# Patient Record
Sex: MALE | Race: WHITE | NOT HISPANIC OR LATINO | Employment: FULL TIME | URBAN - METROPOLITAN AREA
[De-identification: names, ages, dates, MRNs, and addresses within clinical notes are randomized per-mention and may not be internally consistent; named-entity substitution may affect disease eponyms.]

---

## 2017-03-07 RX ORDER — LOSARTAN POTASSIUM 100 MG/1
TABLET ORAL
Qty: 90 TABLET | Refills: 0 | Status: SHIPPED | OUTPATIENT
Start: 2017-03-07 | End: 2017-04-28 | Stop reason: DRUGHIGH

## 2017-04-28 ENCOUNTER — OFFICE VISIT (OUTPATIENT)
Dept: FAMILY MEDICINE CLINIC | Facility: CLINIC | Age: 47
End: 2017-04-28

## 2017-04-28 VITALS
DIASTOLIC BLOOD PRESSURE: 72 MMHG | WEIGHT: 188.8 LBS | BODY MASS INDEX: 27.03 KG/M2 | HEART RATE: 64 BPM | HEIGHT: 70 IN | SYSTOLIC BLOOD PRESSURE: 118 MMHG | OXYGEN SATURATION: 97 % | RESPIRATION RATE: 16 BRPM

## 2017-04-28 DIAGNOSIS — I10 ESSENTIAL HYPERTENSION: Primary | ICD-10-CM

## 2017-04-28 PROCEDURE — 99213 OFFICE O/P EST LOW 20 MIN: CPT | Performed by: FAMILY MEDICINE

## 2017-04-29 NOTE — PROGRESS NOTES
"Subjective   Iam Blanco is a 46 y.o. male.     Hypertension   This is a chronic problem. The problem is unchanged. The problem is controlled. Pertinent negatives include no chest pain, headaches, malaise/fatigue, palpitations, peripheral edema or shortness of breath. Risk factors for coronary artery disease include male gender, sedentary lifestyle and smoking/tobacco exposure. Past treatments include angiotensin blockers. The current treatment provides significant improvement. There are no compliance problems.  There is no history of angina, kidney disease or CAD/MI.        The following portions of the patient's history were reviewed and updated as appropriate: allergies, current medications, past social history and problem list.    Review of Systems   Constitutional: Negative for fatigue, malaise/fatigue and unexpected weight change.   HENT: Negative for congestion and sore throat.    Respiratory: Negative for cough, chest tightness and shortness of breath.    Cardiovascular: Negative for chest pain, palpitations and leg swelling.   Gastrointestinal: Negative for nausea.   Skin: Negative for color change and rash.   Neurological: Negative for dizziness, syncope, weakness and headaches.       Objective   /72  Pulse 64  Resp 16  Ht 70\" (177.8 cm)  Wt 188 lb 12.8 oz (85.6 kg)  SpO2 97%  BMI 27.09 kg/m2  Physical Exam   Constitutional: He is oriented to person, place, and time. He appears well-developed and well-nourished. He is cooperative.   HENT:   Head: Normocephalic.   Right Ear: External ear normal.   Left Ear: External ear normal.   Nose: Nose normal.   Mouth/Throat: Oropharynx is clear and moist.   Eyes: Conjunctivae are normal. Pupils are equal, round, and reactive to light. No scleral icterus.   Neck: Neck supple. Carotid bruit is not present. No thyromegaly present.   Cardiovascular: Normal rate, regular rhythm and normal heart sounds.    Pulmonary/Chest: Effort normal and breath sounds " normal.   Abdominal: There is no hepatosplenomegaly.   Musculoskeletal: Normal range of motion. He exhibits no edema.   Neurological: He is alert and oriented to person, place, and time.   No focal deficits no lateralizing signs   Skin: Skin is warm and dry. No rash noted.   Psychiatric: He has a normal mood and affect. Cognition and memory are normal.   Nursing note and vitals reviewed.      Assessment/Plan   Problem List Items Addressed This Visit        Cardiovascular and Mediastinum    Hypertension - Primary          No orders of the defined types were placed in this encounter.    Patient is much improved since curtailing alcohol.  He also gave up smoking.  May consider reducing medication again if symptoms continue to improve and blood pressure remains normal.

## 2017-08-31 ENCOUNTER — OFFICE VISIT (OUTPATIENT)
Dept: FAMILY MEDICINE CLINIC | Facility: CLINIC | Age: 47
End: 2017-08-31

## 2017-08-31 VITALS
TEMPERATURE: 98.1 F | DIASTOLIC BLOOD PRESSURE: 72 MMHG | BODY MASS INDEX: 26.28 KG/M2 | RESPIRATION RATE: 16 BRPM | SYSTOLIC BLOOD PRESSURE: 112 MMHG | HEIGHT: 70 IN | OXYGEN SATURATION: 98 % | WEIGHT: 183.6 LBS | HEART RATE: 73 BPM

## 2017-08-31 DIAGNOSIS — M25.561 RIGHT KNEE PAIN, UNSPECIFIED CHRONICITY: ICD-10-CM

## 2017-08-31 DIAGNOSIS — I10 ESSENTIAL HYPERTENSION: ICD-10-CM

## 2017-08-31 DIAGNOSIS — Z00.00 WELL ADULT EXAM: Primary | ICD-10-CM

## 2017-08-31 LAB
ALBUMIN SERPL-MCNC: 4.8 G/DL (ref 3.2–4.8)
ALBUMIN/GLOB SERPL: 1.8 G/DL (ref 1.5–2.5)
ALP SERPL-CCNC: 57 U/L (ref 25–100)
ALT SERPL W P-5'-P-CCNC: 36 U/L (ref 7–40)
ANION GAP SERPL CALCULATED.3IONS-SCNC: 8 MMOL/L (ref 3–11)
ARTICHOKE IGE QN: 118 MG/DL (ref 0–130)
AST SERPL-CCNC: 24 U/L (ref 0–33)
BASOPHILS # BLD AUTO: 0.04 10*3/MM3 (ref 0–0.2)
BASOPHILS NFR BLD AUTO: 0.4 % (ref 0–1)
BILIRUB BLD-MCNC: NEGATIVE MG/DL
BILIRUB SERPL-MCNC: 1 MG/DL (ref 0.3–1.2)
BUN BLD-MCNC: 15 MG/DL (ref 9–23)
BUN/CREAT SERPL: 16.7 (ref 7–25)
CALCIUM SPEC-SCNC: 10.2 MG/DL (ref 8.7–10.4)
CHLORIDE SERPL-SCNC: 103 MMOL/L (ref 99–109)
CHOLEST SERPL-MCNC: 183 MG/DL (ref 0–200)
CLARITY, POC: CLEAR
CO2 SERPL-SCNC: 31 MMOL/L (ref 20–31)
COLOR UR: YELLOW
CREAT BLD-MCNC: 0.9 MG/DL (ref 0.6–1.3)
DEPRECATED RDW RBC AUTO: 41.1 FL (ref 37–54)
EOSINOPHIL # BLD AUTO: 0.26 10*3/MM3 (ref 0–0.3)
EOSINOPHIL NFR BLD AUTO: 2.7 % (ref 0–3)
ERYTHROCYTE [DISTWIDTH] IN BLOOD BY AUTOMATED COUNT: 12.9 % (ref 11.3–14.5)
GFR SERPL CREATININE-BSD FRML MDRD: 91 ML/MIN/1.73
GLOBULIN UR ELPH-MCNC: 2.7 GM/DL
GLUCOSE BLD-MCNC: 91 MG/DL (ref 70–100)
GLUCOSE UR STRIP-MCNC: NEGATIVE MG/DL
HCT VFR BLD AUTO: 47.8 % (ref 38.9–50.9)
HDLC SERPL-MCNC: 51 MG/DL (ref 40–60)
HGB BLD-MCNC: 16.2 G/DL (ref 13.1–17.5)
IMM GRANULOCYTES # BLD: 0.02 10*3/MM3 (ref 0–0.03)
IMM GRANULOCYTES NFR BLD: 0.2 % (ref 0–0.6)
KETONES UR QL: NEGATIVE
LEUKOCYTE EST, POC: NEGATIVE
LYMPHOCYTES # BLD AUTO: 3.66 10*3/MM3 (ref 0.6–4.8)
LYMPHOCYTES NFR BLD AUTO: 38 % (ref 24–44)
MCH RBC QN AUTO: 29.6 PG (ref 27–31)
MCHC RBC AUTO-ENTMCNC: 33.9 G/DL (ref 32–36)
MCV RBC AUTO: 87.4 FL (ref 80–99)
MONOCYTES # BLD AUTO: 0.83 10*3/MM3 (ref 0–1)
MONOCYTES NFR BLD AUTO: 8.6 % (ref 0–12)
NEUTROPHILS # BLD AUTO: 4.83 10*3/MM3 (ref 1.5–8.3)
NEUTROPHILS NFR BLD AUTO: 50.1 % (ref 41–71)
NITRITE UR-MCNC: NEGATIVE MG/ML
PH UR: 6 [PH] (ref 5–8)
PLATELET # BLD AUTO: 244 10*3/MM3 (ref 150–450)
PMV BLD AUTO: 12.3 FL (ref 6–12)
POTASSIUM BLD-SCNC: 4.4 MMOL/L (ref 3.5–5.5)
PROT SERPL-MCNC: 7.5 G/DL (ref 5.7–8.2)
PROT UR STRIP-MCNC: NEGATIVE MG/DL
PSA SERPL-MCNC: 0.6 NG/ML (ref 0–4)
RBC # BLD AUTO: 5.47 10*6/MM3 (ref 4.2–5.76)
RBC # UR STRIP: NEGATIVE /UL
SODIUM BLD-SCNC: 142 MMOL/L (ref 132–146)
SP GR UR: 1.01 (ref 1–1.03)
TRIGL SERPL-MCNC: 110 MG/DL (ref 0–150)
TSH SERPL DL<=0.05 MIU/L-ACNC: 1.33 MIU/ML (ref 0.35–5.35)
UROBILINOGEN UR QL: NORMAL
WBC NRBC COR # BLD: 9.64 10*3/MM3 (ref 3.5–10.8)

## 2017-08-31 PROCEDURE — 80053 COMPREHEN METABOLIC PANEL: CPT | Performed by: FAMILY MEDICINE

## 2017-08-31 PROCEDURE — 84443 ASSAY THYROID STIM HORMONE: CPT | Performed by: FAMILY MEDICINE

## 2017-08-31 PROCEDURE — 93000 ELECTROCARDIOGRAM COMPLETE: CPT | Performed by: FAMILY MEDICINE

## 2017-08-31 PROCEDURE — 84153 ASSAY OF PSA TOTAL: CPT | Performed by: FAMILY MEDICINE

## 2017-08-31 PROCEDURE — 99396 PREV VISIT EST AGE 40-64: CPT | Performed by: FAMILY MEDICINE

## 2017-08-31 PROCEDURE — 36415 COLL VENOUS BLD VENIPUNCTURE: CPT | Performed by: FAMILY MEDICINE

## 2017-08-31 PROCEDURE — 80061 LIPID PANEL: CPT | Performed by: FAMILY MEDICINE

## 2017-08-31 PROCEDURE — 85025 COMPLETE CBC W/AUTO DIFF WBC: CPT | Performed by: FAMILY MEDICINE

## 2017-08-31 PROCEDURE — 81003 URINALYSIS AUTO W/O SCOPE: CPT | Performed by: FAMILY MEDICINE

## 2017-08-31 RX ORDER — LOSARTAN POTASSIUM 50 MG/1
50 TABLET ORAL DAILY
Qty: 90 TABLET | Refills: 1 | Status: SHIPPED | OUTPATIENT
Start: 2017-08-31 | End: 2018-03-26 | Stop reason: SDUPTHER

## 2017-08-31 RX ORDER — CETIRIZINE HYDROCHLORIDE 10 MG/1
10 TABLET ORAL DAILY
COMMUNITY
End: 2018-04-12

## 2017-09-05 ENCOUNTER — HOSPITAL ENCOUNTER (OUTPATIENT)
Dept: GENERAL RADIOLOGY | Facility: HOSPITAL | Age: 47
Discharge: HOME | End: 2017-09-05
Attending: FAMILY MEDICINE | Admitting: FAMILY MEDICINE

## 2017-09-05 DIAGNOSIS — M25.561 RIGHT KNEE PAIN, UNSPECIFIED CHRONICITY: ICD-10-CM

## 2017-09-05 PROCEDURE — 73560 X-RAY EXAM OF KNEE 1 OR 2: CPT

## 2017-09-11 ENCOUNTER — TELEPHONE (OUTPATIENT)
Dept: FAMILY MEDICINE CLINIC | Facility: CLINIC | Age: 47
End: 2017-09-11

## 2017-09-11 DIAGNOSIS — M25.561 RIGHT KNEE PAIN, UNSPECIFIED CHRONICITY: Primary | ICD-10-CM

## 2017-09-11 NOTE — TELEPHONE ENCOUNTER
----- Message from Levar Tanner MD sent at 9/11/2017  4:45 PM EDT -----  Regarding: RE: NEEDS RESULTS  Contact: [phone]  Will ok ortho eval  ----- Message -----     From: Robb Maloney MA     Sent: 9/11/2017  12:56 PM       To: Levar Tanner MD  Subject: FW: NEEDS RESULTS                                He is still have having significant pain and wants to know if he should have a MRI or be referred some place else.    ----- Message -----     From: Haven Salcedo     Sent: 9/11/2017  12:22 PM       To: Robb Maloney MA  Subject: NEEDS RESULTS                                    PATIENT HAD XRAY LAST WEEK AND NEEDS RESULTS.      Called informed pt about message and that someone from the surgeons office will be getting in contact with him about a appt.  MATTHEW DESAI

## 2017-09-22 ENCOUNTER — OFFICE VISIT (OUTPATIENT)
Dept: ORTHOPEDIC SURGERY | Facility: CLINIC | Age: 47
End: 2017-09-22

## 2017-09-22 VITALS
SYSTOLIC BLOOD PRESSURE: 128 MMHG | HEIGHT: 70 IN | BODY MASS INDEX: 26.48 KG/M2 | HEART RATE: 75 BPM | DIASTOLIC BLOOD PRESSURE: 75 MMHG | WEIGHT: 185 LBS

## 2017-09-22 DIAGNOSIS — M76.31 ILIOTIBIAL BAND SYNDROME OF RIGHT SIDE: Primary | ICD-10-CM

## 2017-09-22 PROCEDURE — 99203 OFFICE O/P NEW LOW 30 MIN: CPT | Performed by: ORTHOPAEDIC SURGERY

## 2017-09-22 NOTE — PROGRESS NOTES
[facility]    Subjective     Chief Complaint   Patient presents with   • Right Knee - Pain        HPI      Iam Blanco is a 47 y.o. male.  Is a 3 month history of right knee pain worse with hiking.  Better with rest.  His pain 6 out of 10.  It is burning pain.  No injury.        Past Medical History:   Diagnosis Date   • Anxiety    • H/O esophageal reflux    • Hypertension    • Influenza    • Tobacco abuse    • Toe pain       Past Surgical History:   Procedure Laterality Date   • HERNIA REPAIR        Family History   Problem Relation Age of Onset   • Heart disease Father    • Hypertension Father      Social History     Social History   • Marital status:      Spouse name: N/A   • Number of children: N/A   • Years of education: N/A     Occupational History   • Not on file.     Social History Main Topics   • Smoking status: Former Smoker     Packs/day: 1.00     Years: 24.00     Types: Cigarettes     Start date: 1990     Quit date: 2014   • Smokeless tobacco: Former User     Types: Snuff     Quit date: 2014   • Alcohol use No   • Drug use: No   • Sexual activity: Defer     Other Topics Concern   • Not on file     Social History Narrative      Current Outpatient Prescriptions on File Prior to Visit   Medication Sig Dispense Refill   • cetirizine (zyrTEC) 10 MG tablet Take 10 mg by mouth Daily. OTC     • losartan (COZAAR) 50 MG tablet Take 1 tablet by mouth Daily. 90 tablet 1     No current facility-administered medications on file prior to visit.       Allergies   Allergen Reactions   • Sulfa Antibiotics         The following portions of the patient's history were reviewed and updated as appropriate: allergies, current medications, past family history, past medical history, past social history, past surgical history and problem list.    Review of Systems   Constitutional: Negative.    HENT: Negative.    Eyes: Negative.    Respiratory: Negative.    Cardiovascular: Negative.   "  Gastrointestinal: Negative.    Endocrine: Negative.    Genitourinary: Negative.    Musculoskeletal: Positive for arthralgias.   Skin: Negative.    Allergic/Immunologic: Negative.    Neurological: Negative.    Hematological: Negative.    Psychiatric/Behavioral: Negative.         Objective      Physical Exam  /75  Pulse 75  Ht 70\" (177.8 cm)  Wt 185 lb (83.9 kg)  BMI 26.54 kg/m2    Body mass index is 26.54 kg/(m^2).        GENERAL APPEARANCE: awake, alert & oriented x 3, in no acute distress and well developed, well nourished  PSYCH: normal mood andaffect  LUNGS:  breathing nonlabored, no wheezing  EYES: sclera anicteric, pupils equal  CARDIOVASCULAR: palpable pulses dorsalis pedis, palpable posterior tibial bilaterally. Capillary refill less than 2 seconds  INTEGUMENTARY: skin intact, no clubbing, cyanosis  NEUROLOGIC:  Normal gait and balance            Ortho Exam  Peripheral Vascular:    Upper Extremity:   Inspection:  Left--no cyanotic nail beds Right--no cyanotic nail beds   Bilateral:  Pink nail beds with brisk capillary refill   Palpation:  Bilateral radial pulse normal    Musculoskeletal:  Global Assessment:  Overall assessment of Lower Extremity Muscle Strength and Tone:    Right quadriceps--5/5  Right hamstrings--5/5  Right tibialis anterior--5/5  Right gastroc soleus--5/5  Right EHL--5/5    Lower Extremity:  Knee/Patella:  No digital clubbing or cyanosis.    Examination of right knee reveals:  Normal deep tendon reflexes, coordination, strength, tone, sensation.  No known fractures or deformities.    Inspection and Palpation:      Right knee:  Tenderness:  Lateral joint line and IT band   Effusion:  none  Crepitus:  none  Pulses:  2+  Ecchymosis:  None  Warmth:  None     ROM:  Right:  Extension:0    Flexion:135  Left:  Extension:0     Flexion:135    Instability:      Right:  Lachman Test:  Negative, Varus stress test negative, Valgus stress test negative   Anterior Drawer Test:  Negative, " Posterior Drawer Test:  Negative    Deformities/Malalignments/Discrepancies:    Left:  none  Right:  none    Functional Testing:  Right:  Nico's test:  Pain Patella grind test:  Negative  Q-angle:  Normal  Apprehension Sign:  Negative        Imaging/Studies  Imaging Results (last 24 hours)     ** No results found for the last 24 hours. **        I reviewed the x-rays right knee from September 5 and they are negative  Assessment/Plan      Iam was seen today for pain.    Diagnoses and all orders for this visit:    Iliotibial band syndrome of right side  -     Ambulatory Referral to Physical Therapy Evaluate and treat      He will continue anti-inflammatories and rest in addition to physical therapy.  He will follow-up in 3 weeks if not better we will get an MRI to rule out meniscus tear    Medical Decision Making  Management Options : over-the-counter medicine and physical/occupational therapy  Data/Risk: radiology tests and independent visualization of imaging, lab tests, or EMG/NCV    Sincere Kim MD  09/22/17  8:25 AM

## 2017-10-05 ENCOUNTER — HOSPITAL ENCOUNTER (OUTPATIENT)
Dept: PHYSICAL THERAPY | Facility: HOSPITAL | Age: 47
Setting detail: THERAPIES SERIES
Discharge: HOME | End: 2017-10-05
Attending: ORTHOPAEDIC SURGERY

## 2017-10-05 DIAGNOSIS — M76.31 IT BAND SYNDROME, RIGHT: ICD-10-CM

## 2017-10-05 DIAGNOSIS — M25.561 CHRONIC PAIN OF RIGHT KNEE: Primary | ICD-10-CM

## 2017-10-05 DIAGNOSIS — G89.29 CHRONIC PAIN OF RIGHT KNEE: Primary | ICD-10-CM

## 2017-10-05 PROCEDURE — 97161 PT EVAL LOW COMPLEX 20 MIN: CPT

## 2017-10-05 NOTE — THERAPY EVALUATION
Outpatient Physical Therapy Ortho Initial Evaluation  [facility]     Patient Name: Iam Blanco  : 1970  MRN: [id number]  Today's Date: 10/5/2017      Visit Date: 10/05/2017    Patient Active Problem List   Diagnosis   • Anxiety   • Essential hypertension        Past Medical History:   Diagnosis Date   • Anxiety    • H/O esophageal reflux    • Hypertension    • Influenza    • Tobacco abuse    • Toe pain         Past Surgical History:   Procedure Laterality Date   • HERNIA REPAIR         Visit Dx:     ICD-10-CM ICD-9-CM   1. Chronic pain of right knee M25.561 719.46    G89.29 338.29   2. It band syndrome, right M76.31 728.89             Patient History       10/05/17 1345          History    Chief Complaint Pain;Muscle tenderness  -MM      Type of Pain Knee pain   right  -MM      Date Current Problem(s) Began 17  -MM      Brief Description of Current Complaint Client presents with right knee pain that started after hiking in .  He frequently hikes long distances.  He typically notices an increase in discomfort after hiking for 2 miles.  The past 2 weeks he has noticed some improvement in pain.  -MM      Patient/Caregiver Goals Return to prior level of function;Relieve pain  -MM      Occupation/sports/leisure activities Employed with [employer]  -MM      Pain     Pain Location Knee   Right  -MM      Pain at Present 1  -MM      Pain at Best 0  -MM      Pain at Worst 2  -MM      Pain Frequency Intermittent  -MM      Pain Description Burning;Sharp  -MM      Pain Comments Sharp pain after hiking long distances.  Mild discomfort at times with regular daily activity walking.  -MM      Difficulties with ADL's? Hiking and walking long distances  -MM      Fall Risk Assessment    Any falls in the past year: No  -MM      Daily Activities    Primary Language English  -MM      How does patient learn best? Listening;Reading;Demonstration  -MM      Pt Participated in POC and Goals  Yes  -MM        User Key  (r) = Recorded By, (t) = Taken By, (c) = Cosigned By    Initials Name Provider Type    CHARLES Craven PT Physical Therapist                PT Ortho       10/05/17 1345    Posture/Observations    Posture/Observations Comments No noticeable swelling or abnormality  -MM    Hip Special Tests    Javier’s test (tightness of ITB) Negative   Slight tenderness  -MM    Knee Special Tests    Anterior drawer (ACL lesion) Negative  -MM    Lachman’s (ACL lesion) Negative  -MM    Posterior drawer (PCL lesion) Negative  -MM    Valgus stress (MCL lesion) Negative  -MM    Varus stress (LCL lesion) Negative  -MM    Thessaly test (meniscal lesion) Negative  -MM    ROM (Range of Motion)    General ROM Detail Full knee and hip range of motion.  Palpation: Marked tenderness right distal IT band.  -MM    MMT (Manual Muscle Testing)    General MMT Assessment Detail Right knee and hip strength are within normal limits throughout.  -MM      User Key  (r) = Recorded By, (t) = Taken By, (c) = Cosigned By    Initials Name Provider Type    CHARLES Craven PT Physical Therapist                            Therapy Education       10/05/17 1345          Therapy Education    Education Details Provided home program to include lower trunk rotation stretch, piriformis stretch, and IT band stretch.  -MM        User Key  (r) = Recorded By, (t) = Taken By, (c) = Cosigned By    Initials Name Provider Type    CHARLES Craven PT Physical Therapist                PT OP Goals       10/05/17 1345       PT Short Term Goals    STG Date to Achieve 10/26/17  -MM     STG 1 Right knee pain will return to normal.  -MM     STG 1 Progress New  -MM     STG 2 Lateral right knee tenderness will resolve.  -MM     STG 2 Progress New  -MM     STG 3 Client will return to hiking without difficulty.  -MM     STG 3 Progress New  -MM     Long Term Goals    LTG Date to Achieve 11/02/17  -MM     LTG 1 LEF S score will improve to 100%.  -MM      LTG 1 Progress New  -MM     Time Calculation    PT Goal Re-Cert Due Date 01/03/18  -MM       User Key  (r) = Recorded By, (t) = Taken By, (c) = Cosigned By    Initials Name Provider Type    CHARLES Craven, PT Physical Therapist                PT Assessment/Plan       10/05/17 1345       PT Assessment    Functional Limitations Limitations in community activities;Performance in sport activities;Performance in self-care ADL  -MM     Impairments Pain  -MM     Assessment Comments Client presents with evolving symptoms of low complexity.  Signs and symptoms are consistent with right IT band syndrome.  Skilled intervention is required to normalize pain and function.  -MM     Please refer to paper survey for additional self-reported information Yes  -MM     Rehab Potential Good  -MM     Patient/caregiver participated in establishment of treatment plan and goals Yes  -MM     Patient would benefit from skilled therapy intervention Yes  -MM     PT Plan    PT Frequency 1x/week  -MM     Predicted Duration of Therapy Intervention (days/wks) 6 visits  -MM     Planned CPT's? PT EVAL LOW COMPLEXITY: 08943;PT THER PROC EA 15 MIN: 64725;PT MANUAL THERAPY EA 15 MIN: 52407;PT IONTOPHORESIS EA 15 MIN: 62726;PT HOT/COLD PACK WC NONMCARE: 86808  -MM     PT Plan Comments Continue per plan of treatment with exercises, manual therapy, and iontophoresis.  -MM       User Key  (r) = Recorded By, (t) = Taken By, (c) = Cosigned By    Initials Name Provider Type    CHARLES Craevn, PT Physical Therapist                                    Outcome Measures       10/05/17 1345          Lower Extremity Functional Index    Any of your usual work, housework or school activities 4  -MM      Your usual hobbies, recreational or sporting activities 4  -MM      Getting into or out of the bath 4  -MM      Walking between rooms 4  -MM      Putting on your shoes or socks 4  -MM      Squatting 4  -MM      Lifting an object, like a bag of groceries from  the floor 4  -MM      Performing light activities around your home 4  -MM      Performing heavy activities around your home 4  -MM      Getting into or out of a car 4  -MM      Walking 2 blocks 4  -MM      Walking a mile 4  -MM      Going up or down 10 stairs (about 1 flight of stairs) 4  -MM      Standing for 1 hour 4  -MM      Sitting for 1 hour 4  -MM      Running on even ground 3  -MM      Running on uneven ground 3  -MM      Making sharp turns while running fast 4  -MM      Hopping 4  -MM      Rolling over in bed 4  -MM      Total 78  -MM      Functional Assessment    Outcome Measure Options Lower Extremity Functional Scale (LEFS)   97.5%  -MM        User Key  (r) = Recorded By, (t) = Taken By, (c) = Cosigned By    Initials Name Provider Type    MM Ty Craven, PT Physical Therapist            Time Calculation:   Start Time: 1345     Therapy Charges for Today     Code Description Service Date Service Provider Modifiers Qty    34198598048  PT EVAL LOW COMPLEXITY 4 10/5/2017 yT Craven, PT GP 1          PT G-Codes  Outcome Measure Options: Lower Extremity Functional Scale (LEFS) (97.5%)         Ty Craven, PT  10/5/2017

## 2017-10-27 ENCOUNTER — DOCUMENTATION (OUTPATIENT)
Dept: PHYSICAL THERAPY | Facility: HOSPITAL | Age: 47
End: 2017-10-27

## 2017-10-27 DIAGNOSIS — G89.29 CHRONIC PAIN OF RIGHT KNEE: Primary | ICD-10-CM

## 2017-10-27 DIAGNOSIS — M25.561 CHRONIC PAIN OF RIGHT KNEE: Primary | ICD-10-CM

## 2017-10-27 DIAGNOSIS — M76.31 IT BAND SYNDROME, RIGHT: ICD-10-CM

## 2017-10-27 NOTE — THERAPY DISCHARGE NOTE
Outpatient Physical Therapy Discharge Summary         Patient Name: Iam Blanco  : 1970  MRN: [id number]    Today's Date: 10/27/2017    Visit Dx:    ICD-10-CM ICD-9-CM   1. Chronic pain of right knee M25.561 719.46    G89.29 338.29   2. It band syndrome, right M76.31 728.89             PT OP Goals       10/27/17 1600       PT Short Term Goals    STG Date to Achieve 10/26/17  -MM     STG 1 Right knee pain will return to normal.  -MM     STG 1 Progress Not Met  -MM     STG 2 Lateral right knee tenderness will resolve.  -MM     STG 2 Progress Not Met  -MM     STG 3 Client will return to hiking without difficulty.  -MM     STG 3 Progress Not Met  -MM     Long Term Goals    LTG Date to Achieve 17  -MM     LTG 1 LEF S score will improve to 100%.  -MM     LTG 1 Progress Not Met  -MM       User Key  (r) = Recorded By, (t) = Taken By, (c) = Cosigned By    Initials Name Provider Type    MM Ty Craven, PT Physical Therapist          OP PT Discharge Summary  Date of Discharge: 10/27/17  Reason for Discharge: Non-compliant  Outcomes Achieved: Other (goals abandoned)  Discharge Destination: Home with home program  Discharge Instructions: Client No showed x 3 and did not return after the initial visit.  Client understanding is good.  Prognosis is fair.          Ty Craven, PT  10/27/2017

## 2018-03-27 RX ORDER — LOSARTAN POTASSIUM 50 MG/1
50 TABLET ORAL DAILY
Qty: 90 TABLET | Refills: 0 | Status: SHIPPED | OUTPATIENT
Start: 2018-03-27 | End: 2018-06-25 | Stop reason: SDUPTHER

## 2018-04-12 ENCOUNTER — OFFICE VISIT (OUTPATIENT)
Dept: FAMILY MEDICINE CLINIC | Facility: CLINIC | Age: 48
End: 2018-04-12

## 2018-04-12 VITALS
DIASTOLIC BLOOD PRESSURE: 72 MMHG | OXYGEN SATURATION: 98 % | SYSTOLIC BLOOD PRESSURE: 110 MMHG | WEIGHT: 184 LBS | RESPIRATION RATE: 16 BRPM | HEART RATE: 76 BPM | BODY MASS INDEX: 26.34 KG/M2 | HEIGHT: 70 IN

## 2018-04-12 DIAGNOSIS — K58.0 IRRITABLE BOWEL SYNDROME WITH DIARRHEA: ICD-10-CM

## 2018-04-12 DIAGNOSIS — J30.1 CHRONIC SEASONAL ALLERGIC RHINITIS DUE TO POLLEN: Primary | ICD-10-CM

## 2018-04-12 PROCEDURE — 99213 OFFICE O/P EST LOW 20 MIN: CPT | Performed by: FAMILY MEDICINE

## 2018-04-12 RX ORDER — CIPROFLOXACIN 500 MG/1
500 TABLET, FILM COATED ORAL EVERY 12 HOURS SCHEDULED
Qty: 60 TABLET | Refills: 0 | Status: SHIPPED | OUTPATIENT
Start: 2018-04-12 | End: 2018-05-30 | Stop reason: HOSPADM

## 2018-04-12 RX ORDER — LEVOCETIRIZINE DIHYDROCHLORIDE 5 MG/1
5 TABLET, FILM COATED ORAL EVERY EVENING
Qty: 90 TABLET | Refills: 1 | Status: SHIPPED | OUTPATIENT
Start: 2018-04-12 | End: 2018-10-21 | Stop reason: SDUPTHER

## 2018-04-13 NOTE — PROGRESS NOTES
"Subjective   Iam Blanco is a 47 y.o. male.     Iam is having bouts of gas and explosive bowel movements sebveral times a day      Allergies   This is a recurrent problem. The current episode started 1 to 4 weeks ago. The problem occurs constantly. The problem has been unchanged. Associated symptoms include abdominal pain, coughing and headaches. Pertinent negatives include no chest pain, chills, congestion, joint swelling, nausea, rash or sore throat. Nothing aggravates the symptoms. Treatments tried: antacids. The treatment provided no relief.   Gas   This is a recurrent problem. The current episode started 1 to 4 weeks ago. The problem occurs 2 to 4 times per day. The problem has been unchanged. Associated symptoms include abdominal pain, coughing and headaches. Pertinent negatives include no chest pain, chills, congestion, joint swelling, nausea, rash or sore throat. The treatment provided no relief.        The following portions of the patient's history were reviewed and updated as appropriate: allergies, current medications, past social history and problem list.    Review of Systems   Constitutional: Negative for chills.   HENT: Negative for congestion, sinus pressure and sore throat.    Respiratory: Positive for cough.    Cardiovascular: Negative for chest pain and palpitations.   Gastrointestinal: Positive for abdominal pain, diarrhea and flatus. Negative for constipation and nausea.   Genitourinary: Negative for dysuria and hematuria.   Musculoskeletal: Negative for back pain and joint swelling.   Skin: Negative for rash.   Neurological: Positive for headaches.       Objective   /72   Pulse 76   Resp 16   Ht 177.8 cm (70\")   Wt 83.5 kg (184 lb)   SpO2 98%   BMI 26.40 kg/m²   Physical Exam   Constitutional: He is oriented to person, place, and time. He appears well-developed and well-nourished. He is cooperative.   HENT:   Head: Normocephalic.   Right Ear: External ear normal.   Left " Ear: External ear normal.   Nose: Nose normal.   Mouth/Throat: Oropharynx is clear and moist.   Clear pnd   Eyes: Conjunctivae are normal. Pupils are equal, round, and reactive to light. No scleral icterus.   Neck: Neck supple. Carotid bruit is not present. No thyromegaly present.   Cardiovascular: Normal rate and regular rhythm.    Pulmonary/Chest: Effort normal and breath sounds normal.   Abdominal: Soft. Bowel sounds are normal. He exhibits no distension and no mass. There is no hepatosplenomegaly. There is no tenderness.   Musculoskeletal: Normal range of motion.   Neurological: He is alert and oriented to person, place, and time.   No focal deficits no lateralizing signs   Skin: Skin is warm and dry. No rash noted.   Psychiatric: He has a normal mood and affect. Cognition and memory are normal.   Nursing note and vitals reviewed.      Assessment/Plan   Problem List Items Addressed This Visit     None      Visit Diagnoses     Chronic seasonal allergic rhinitis due to pollen    -  Primary    Irritable bowel syndrome with diarrhea              New Medications Ordered This Visit   Medications   • levocetirizine (XYZAL) 5 MG tablet     Sig: Take 1 tablet by mouth Every Evening.     Dispense:  90 tablet     Refill:  1   • ciprofloxacin (CIPRO) 500 MG tablet     Sig: Take 1 tablet by mouth Every 12 (Twelve) Hours.     Dispense:  60 tablet     Refill:  0     Start probiotic after cipro and obtain stool specimen. May need gi eval.

## 2018-04-23 ENCOUNTER — TELEPHONE (OUTPATIENT)
Dept: FAMILY MEDICINE CLINIC | Facility: CLINIC | Age: 48
End: 2018-04-23

## 2018-04-23 NOTE — TELEPHONE ENCOUNTER
Called informed pt, he verbalized understanding.  MATTHEW DESAI    ----- Message from Levar Tanner MD sent at 4/23/2018  4:38 PM EDT -----  Regarding: RE: SAMPLES/RESULTS  Contact: [phone]  Results came in today and are negative if still having problems can refer to GI  ----- Message -----  From: Robb Maloney MA  Sent: 4/23/2018  12:51 PM  To: Levar Tanner MD  Subject: FW: SAMPLES/RESULTS                                  ----- Message -----  From: Komal Altamirano  Sent: 4/23/2018   7:43 AM  To: Robb Maloney MA  Subject: SAMPLES/RESULTS                                  PATIENT SAID HE DROPPED OFF SAMPLES LAST Saturday, AND STILL HASN'T HEARD ABOUT RESULTS.  THANKS,  KOMAL

## 2018-04-24 ENCOUNTER — TELEPHONE (OUTPATIENT)
Dept: FAMILY MEDICINE CLINIC | Facility: CLINIC | Age: 48
End: 2018-04-24

## 2018-04-24 DIAGNOSIS — K52.9 CHRONIC DIARRHEA: Primary | ICD-10-CM

## 2018-04-24 NOTE — TELEPHONE ENCOUNTER
LVM informing jazz Maloney  A    ----- Message from Levar Tanner MD sent at 4/24/2018 12:05 PM EDT -----  Regarding: RE: SAMPLES/RESULTS  Contact: [phone]  Order placed  ----- Message -----  From: Robb Maloney MA  Sent: 4/23/2018   5:20 PM  To: Levar Tanner MD  Subject: FW: SAMPLES/RESULTS                              Would like the referral    ----- Message -----  From: Levar Tanner MD  Sent: 4/23/2018   4:38 PM  To: Robb Maloney MA  Subject: RE: SAMPLES/RESULTS                              Results came in today and are negative if still having problems can refer to GI  ----- Message -----  From: Robb Maloney MA  Sent: 4/23/2018  12:51 PM  To: Levar Tanner MD  Subject: FW: SAMPLES/RESULTS                                  ----- Message -----  From: Komal Altamirano  Sent: 4/23/2018   7:43 AM  To: Robb Maloney MA  Subject: SAMPLES/RESULTS                                  PATIENT SAID HE DROPPED OFF SAMPLES LAST Saturday, AND STILL HASN'T HEARD ABOUT RESULTS.  THANKS,  KOMAL

## 2018-05-30 ENCOUNTER — OFFICE VISIT (OUTPATIENT)
Dept: GASTROENTEROLOGY | Facility: CLINIC | Age: 48
End: 2018-05-30

## 2018-05-30 VITALS
WEIGHT: 189.8 LBS | BODY MASS INDEX: 27.17 KG/M2 | DIASTOLIC BLOOD PRESSURE: 80 MMHG | TEMPERATURE: 98.6 F | HEART RATE: 84 BPM | OXYGEN SATURATION: 96 % | SYSTOLIC BLOOD PRESSURE: 114 MMHG | HEIGHT: 70 IN | RESPIRATION RATE: 14 BRPM

## 2018-05-30 DIAGNOSIS — K52.9 CHRONIC DIARRHEA: Primary | ICD-10-CM

## 2018-05-30 PROCEDURE — 99204 OFFICE O/P NEW MOD 45 MIN: CPT | Performed by: NURSE PRACTITIONER

## 2018-05-30 NOTE — PATIENT INSTRUCTIONS
"· Avoid chewing gum, drinking from a straw, or carbonated beverages (soda/pop).  Avoid smoking if applicable/possible.  · Eat slowly.  You swallow more air when eating fast    · Avoid peppermint, chocolate, and fatty food  · Avoid lactose products (milk, yogurt, cheese, butter, cream, ice cream, and sherbet). Ok for Lactaid milk.   · Avoid cabbage, broccoli, and [place] sprouts.   · Avoid potatoes, corn, noodles, and wheat. Rice is ok  · Avoid soluble fiber (oat bran, peas, and beans)  · Avoid food containing fructose (for example, dried fruit, honey, sucrose, onions, artichokes, and corn syrup) and sorbitol (sugar substitute)    · Take Beano Over-the-Counter (OTC) 1-2 tab BEFORE meal especially when eating grains, cereals, nuts, and vegetables  · Take OTC Gas-X, Phazyme, Maalox Anti-Gas, or Mylanta Gas as needed additionally when you have increased bloating/belching.  · Start OTC probiotic (Culturelle, Ty's Colon, Health, Align or any probiotics that contain \"Lactobacillus\" or \"Bifidobacterium\") once daily x 2 weeks.     · If diarrhea recurs, try to avoid gluten products and call me  ·     Gluten-Free Diet for Celiac Disease, Adult  The gluten-free diet includes all foods that do not contain gluten. Gluten is a protein that is found in wheat, rye, barley, and some other grains. Following the gluten-free diet is the only treatment for people with celiac disease. It helps to prevent damage to the intestines and improves or eliminates the symptoms of celiac disease.  Following the gluten-free diet requires some planning. It can be challenging at first, but it gets easier with time and practice. There are more gluten-free options available today than ever before. If you need help finding gluten-free foods or if you have questions, talk with your diet and nutrition specialist (registered dietitian) or your health care provider.  What do I need to know about a gluten-free diet?  · All fruits, vegetables, and meats " "are safe to eat and do not contain gluten.  · When grocery shopping, start by shopping in the produce, meat, and dairy sections. These sections are more likely to contain gluten-free foods. Then move to the aisles that contain packaged foods if you need to.  · Read all food labels. Gluten is often added to foods. Always check the ingredient list and look for warnings, such as “may contain gluten.\"  · Talk with your dietitian or health care provider before taking a gluten-free multivitamin or mineral supplement.  · Be aware of gluten-free foods having contact with foods that contain gluten (cross-contamination). This can happen at home and with any processed foods.  ¨ Talk with your health care provider or dietitian about how to reduce the risk of cross-contamination in your home.  ¨ If you have questions about how a food is processed, ask the .  What key words help to identify gluten?  Foods that list any of these key words on the label usually contain gluten:  · Wheat, flour, enriched flour, bromated flour, white flour, durum flour, al flour, phosphated flour, self-rising flour, semolina, farina, barley (malt), rye, and oats.  · Starch, dextrin, modified food starch, or cereal.  · Thickening, fillers, or emulsifiers.  · Malt flavoring, malt extract, or malt syrup.  · Hydrolyzed vegetable protein.  In the [place] packaged foods that are gluten-free are required to be labeled “GF.” These foods should be easy to identify and are safe to eat. In the [place] food companies are also required to list common food allergens, including wheat, on their labels.  Recommended foods  Grains   · Amaranth, bean flours, 100% buckwheat flour, corn, millet, nut flours or nut meals, GF oats, quinoa, rice, sorghum, teff, rice wafers, pure cornmeal tortillas, popcorn, and hot cereals made from cornmeal. [place], rice, wild rice. Some Asian rice noodles or bean noodles. Arrowroot starch, corn bran, corn flour, corn germ, " cornmeal, corn starch, potato flour, potato starch flour, and rice bran. Plain, brown, and sweet rice flours. Rice polish, soy flour, and tapioca starch.  Vegetables   · All plain fresh, frozen, and canned vegetables.  Fruits   · All plain fresh, frozen, canned, and dried fruits, and 100% fruit juices.  Meats and other protein foods   · All fresh beef, pork, poultry, fish, seafood, and eggs. Fish canned in water, oil, brine, or vegetable broth. Plain nuts and seeds, peanut butter. Some lunch meat and some frankfurters. Dried beans, dried peas, and lentils.  Dairy   · Fresh plain, dry, evaporated, or condensed milk. Cream, butter, sour cream, whipping cream, and most yogurts. Unprocessed cheese, most processed cheeses, some cottage cheese, some cream cheeses.  Beverages   · Coffee, tea, most herbal teas. Carbonated beverages and some root beers. Wine, sake, and distilled spirits, such as gin, vodka, and whiskey. Most hard ciders.  Fats and oils   · Butter, margarine, vegetable oil, hydrogenated butter, olive oil, shortening, lard, cream, and some mayonnaise. Some commercial salad dressings. Olives.  Sweets and desserts   · Sugar, honey, some syrups, molasses, jelly, and jam. Plain hard candy, marshmallows, and gumdrops. Pure cocoa powder. Plain chocolate. Custard and some pudding mixes. Gelatin desserts, sorbets, frozen ice pops, and sherbet. Cake, cookies, and other desserts prepared with allowed flours. Some commercial ice creams. Cornstarch, tapioca, and rice puddings.  Seasoning and other foods   · Some canned or frozen soups. Monosodium glutamate (MSG). Cider, rice, and wine vinegar. Baking soda and baking powder. Cream of tartar. Baking and nutritional yeast. Certain soy sauces made without wheat (ask your dietitian about specific brands that are allowed). Nuts, coconut, and chocolate. Salt, pepper, herbs, spices, flavoring extracts, imitation or artificial flavorings, natural flavorings, and food colorings.  Some medicines and supplements. Some lip glosses and other cosmetics. Rice syrups.  The items listed may not be a complete list. Talk with your dietitian about what dietary choices are best for you.  Foods to avoid  Grains   · Barley, bran, bulgur, couscous, cracked wheat, [place], farro, al, malt, matzo, semolina, wheat germ, and all wheat and rye cereals including spelt and kamut. Cereals containing malt as a flavoring, such as rice cereal. Noodles, spaghetti, macaroni, most packaged rice mixes, and all mixes containing wheat, rye, barley, or triticale.  Vegetables   · Most creamed vegetables and most vegetables canned in sauces. Some commercially prepared vegetables and salads.  Fruits   · Thickened or prepared fruits and some pie fillings. Some fruit snacks and fruit roll-ups.  Meats and other protein foods   · Any meat or meat alternative containing wheat, rye, barley, or gluten stabilizers. These are often marinated or packaged meats and lunch meats. Bread-containing products, such as Swiss steak, croquettes, meatballs, and meatloaf. Most tuna canned in vegetable broth and turkey with hydrolyzed vegetable protein (HVP) injected as part of the basting. Seitan. Imitation fish. Eggs in sauces made from ingredients to avoid.  Dairy   · Commercial chocolate milk drinks and malted milk. Some non-dairy creamers. Any cheese product containing ingredients to avoid.  Beverages   · Certain cereal beverages. Beer, santosh, malted milk, and some root beers. Some hard ciders. Some instant flavored coffees. Some herbal teas made with barley or with barley malt added.  Fats and oils   · Some commercial salad dressings. Sour cream containing modified food starch.  Sweets and desserts   · Some toffees. Chocolate-coated nuts (may be rolled in wheat flour) and some commercial candies and candy bars. Most cakes, cookies, donuts, pastries, and other baked goods. Some commercial ice cream. Ice cream cones. Commercially prepared mixes  "for cakes, cookies, and other desserts. Bread pudding and other puddings thickened with flour. Products containing brown rice syrup made with barley malt enzyme. Desserts and sweets made with malt flavoring.  Seasoning and other foods   · Some esposito powders, some dry seasoning mixes, some gravy extracts, some meat sauces, some ketchups, some prepared mustards, and horseradish. Certain soy sauces. Malt vinegar. Bouillon and bouillon cubes that contain HVP. Some chip dips, and some chewing gum. Yeast extract. Kim’s yeast. Caramel color. Some medicines and supplements. Some lip glosses and other cosmetics.  The items listed may not be a complete list. Talk with your dietitian about what dietary choices are best for you.  Summary  · Gluten is a protein that is found in wheat, rye, barley, and some other grains. The gluten-free diet includes all foods that do not contain gluten.  · If you need help finding gluten-free foods or if you have questions, talk with your diet and nutrition specialist (registered dietitian) or your health care provider.  · Read all food labels. Gluten is often added to foods. Always check the ingredient list and look for warnings, such as “may contain gluten.\"  This information is not intended to replace advice given to you by your health care provider. Make sure you discuss any questions you have with your health care provider.  Document Released: 12/18/2006 Document Revised: 10/02/2017 Document Reviewed: 10/02/2017  [employer] Patient Education © 2017 [employer]    "

## 2018-05-30 NOTE — PROGRESS NOTES
"    GASTROENTEROLOGY OUTPATIENT NEW PATIENT NOTE  Patient: IAM ANDRADE : 1970  Date of Service: 2018  Dear Dr.Allan ARLETH Tanner MD   Thank you for your referral of this patient for evaluation of diarrhea  CC: Diarrhea  Assessment/Plan                                             ASSESSMENT & PLANS     Chronic diarrhea.  Sx improved recently w/ avoiding dairy, probiotic, and Cipro  · Avoid chewing gum, drinking from a straw, or carbonated beverages (soda/pop).  Avoid smoking if applicable/possible.  · Eat slowly.  You swallow more air when eating fast    · Avoid peppermint, chocolate, and fatty food  · Avoid lactose products (milk, yogurt, cheese, butter, cream, ice cream, and sherbet). Ok for Lactaid milk.   · Avoid cabbage, broccoli, and [place] sprouts.   · Avoid potatoes, corn, noodles, and wheat. Rice is ok  · Avoid soluble fiber (oat bran, peas, and beans)  · Avoid food containing fructose (for example, dried fruit, honey, sucrose, onions, artichokes, and corn syrup) and sorbitol (sugar substitute)    · Take Beano Over-the-Counter (OTC) 1-2 tab BEFORE meal especially when eating grains, cereals, nuts, and vegetables  · Take OTC Gas-X, Phazyme, Maalox Anti-Gas, or Mylanta Gas as needed additionally when you have increased bloating/belching.  · Start OTC probiotic (Culturelle, Ty's Colon, Health, Align or any probiotics that contain \"Lactobacillus\" or \"Bifidobacterium\") once daily x 2 weeks.     · If diarrhea recurs, try to avoid gluten products and call me    Follow Up:Return if symptoms worsen or fail to improve, for Follow Up w/ Daxa.      DISCUSSION: The above plan was delineated in details with patient and all questions and concerns were answered.  Patient is also given contact information.  Patient is to return as scheduled or sooner if new problems arise  Subjective                                                     SUBJECTIVE   History of Present Illness  Mr. Iam Andrade " is a 48 y.o. male presents to the clinic today for an evaluation of Diarrhea  Diarrhea sx have been going for about 2.5 months  [place] 6 or 7 previously but now back down to [place] 3 or 4. At the time, pt was having intermittent bloody stools, but none recently.  No hemorrhoids. +++bloating and lots of gas.  No specific abd pain at the time.  More like a abd discomfort.  Gas discomfort improves after flatus.  Again, bloating has also improves recently    Has avoided all dairy products. Has tried a month of probiotic. Sx got better before Cipro x 3 days. Sx almost back down to normal, but pt keeps today's appt any ways.     No fam hx of autoimmune   Pt denies fever, chills, night sweats, or unintentional weight loss.  Seldom NSAIDS.    Pt denies consuming any suspected food or unpurified water.  No one w/ whom pt has shared a meal has developed diarrhea or exposed to sick contact.    No frequent abx. Pt was not hospitalized in the months leading up to diarrheal illness. No recent travel outside of the [place]. Pt has no family hx of inflammatory bowel disease.   Per outside medical records from [facility], stool cx are negative for Salmonella, Shingella, Ecoli, and H-pylori.    ROS:Review of Systems   Constitutional: Negative.         Pt currently or recently takes NSAIDS ( (i.e Ibuprofen, Aleve, Advil, Exedrin, BC Powder, diclofenac, meloxicam, & Naproxen, etc)?  No    Pt currently or recently takes abx  No   HENT: Negative.    Cardiovascular: Negative.    Gastrointestinal: Positive for blood in stool and diarrhea.   All other systems reviewed and are negative.    Subjective   PAST MED HX: Pt  has a past medical history of Anxiety; H/O esophageal reflux; Hypertension; Influenza; Tobacco abuse; and Toe pain.  PAST SURG HX: Pt  has a past surgical history that includes Hernia repair.  FAM HX: family history includes Heart disease in his father; Hypertension in his father.  SOC HX: Pt  reports that  he quit smoking about 4 years ago. His smoking use included Cigarettes. He started smoking about 28 years ago. He has a 24.00 pack-year smoking history. He quit smokeless tobacco use about 4 years ago. His smokeless tobacco use included Snuff. He reports that he does not drink alcohol or use drugs.  Objective                                                           OBJECTIVE   Allergy: Pt is allergic to sulfa antibiotics.  MEDS: •  levocetirizine (XYZAL) 5 MG tablet, Take 1 tablet by mouth Every Evening., Disp: 90 tablet, Rfl: 1  •  losartan (COZAAR) 50 MG tablet, TAKE 1 TABLET BY MOUTH DAILY., Disp: 90 tablet, Rfl: 0  Lab Results   Component Value Date    WBC 9.64 08/31/2017    HGB 16.2 08/31/2017    HGB 15.7 03/21/2016    HGB 16.5 03/14/2015    HCT 47.8 08/31/2017    HCT 45.7 03/21/2016    HCT 47.2 03/14/2015    MCV 87.4 08/31/2017    MCHC 33.9 08/31/2017     08/31/2017     03/21/2016     03/14/2015     Lab Results   Component Value Date     08/31/2017    K 4.4 08/31/2017     08/31/2017    CO2 31.0 08/31/2017    BUN 15 08/31/2017    CREATININE 0.90 08/31/2017    EGFRIFNONA 91 08/31/2017    GLUCOSE 91 08/31/2017    CALCIUM 10.2 08/31/2017    ANIONGAP 8.0 08/31/2017     Lab Results   Component Value Date    AST 24 08/31/2017    AST 22 09/30/2016    AST 30 03/21/2016    ALT 36 08/31/2017    ALT 29 09/30/2016    ALT 48 (H) 03/21/2016    ALKPHOS 57 08/31/2017    ALKPHOS 52 09/30/2016    ALKPHOS 56 03/21/2016    BILITOT 1.0 08/31/2017    PROTEINTOT 7.5 08/31/2017    ALBUMIN 4.80 08/31/2017     No results found for: LIPASE  Lab Results   Component Value Date    TSH 1.328 08/31/2017    TSH 2.081 03/21/2016    TSH 1.378 03/14/2015      Wt Readings from Last 5 Encounters:   05/30/18 86.1 kg (189 lb 12.8 oz)   04/12/18 83.5 kg (184 lb)   09/22/17 83.9 kg (185 lb)   08/31/17 83.3 kg (183 lb 9.6 oz)   04/28/17 85.6 kg (188 lb 12.8 oz)   body mass index is 27.23 kg/m².,Temp: 98.6 °F (37 °C),BP:  114/80,Heart Rate: 84   Physical Exam  General Well developed; well nourished; no acute distress.   ENT Oral mucosa pink and moist without thrush or lesions.    Neck Neck supple; trachea midline. No thyromegaly   Resp CTA; no rhonchi, rales, or wheezes.  Respiration effort normal  CV RRR; normal S1, S2; no M/R/G. No lower extremity edema  GI Abd soft, NT, ND, normal active bowel sounds.  No HSM.  No abd hernia  Skin No rash; no lesions; no bruises.  Skin turgor normal  Musc No clubbing; no cyanosis.  Gait steady  Psych Oriented to time, place, and person.  Appropriate affect  Thank you kindly for allowing me to be part of this patient’s care.    Pt care team: Daxa GARZON & Deya Ford MA  05/30/188:10 AM  [facility]--Gastroenterology  [phone]    CC: Dr.Allan ARLETH Tanner MD  [address] [place] [facility] [address] FAX:[phone]

## 2018-06-25 RX ORDER — LOSARTAN POTASSIUM 50 MG/1
TABLET ORAL
Qty: 90 TABLET | Refills: 0 | Status: SHIPPED | OUTPATIENT
Start: 2018-06-25 | End: 2019-04-20 | Stop reason: SDUPTHER

## 2018-07-02 RX ORDER — LOSARTAN POTASSIUM 50 MG/1
TABLET ORAL
Qty: 90 TABLET | Refills: 0 | Status: SHIPPED | OUTPATIENT
Start: 2018-07-02 | End: 2018-10-06 | Stop reason: SDUPTHER

## 2018-10-07 RX ORDER — LOSARTAN POTASSIUM 50 MG/1
TABLET ORAL
Qty: 90 TABLET | Refills: 0 | Status: SHIPPED | OUTPATIENT
Start: 2018-10-07 | End: 2019-07-31 | Stop reason: SDUPTHER

## 2018-10-22 RX ORDER — LEVOCETIRIZINE DIHYDROCHLORIDE 5 MG/1
5 TABLET, FILM COATED ORAL EVERY EVENING
Qty: 90 TABLET | Refills: 1 | Status: SHIPPED | OUTPATIENT
Start: 2018-10-22 | End: 2019-04-19 | Stop reason: SDUPTHER

## 2019-04-19 RX ORDER — LEVOCETIRIZINE DIHYDROCHLORIDE 5 MG/1
TABLET, FILM COATED ORAL
Qty: 90 TABLET | Refills: 3 | Status: SHIPPED | OUTPATIENT
Start: 2019-04-19 | End: 2020-04-07

## 2019-04-22 RX ORDER — LOSARTAN POTASSIUM 50 MG/1
TABLET ORAL
Qty: 30 TABLET | Refills: 0 | Status: SHIPPED | OUTPATIENT
Start: 2019-04-22 | End: 2019-07-31 | Stop reason: SDUPTHER

## 2019-07-31 ENCOUNTER — OFFICE VISIT (OUTPATIENT)
Dept: FAMILY MEDICINE CLINIC | Facility: CLINIC | Age: 49
End: 2019-07-31

## 2019-07-31 VITALS
OXYGEN SATURATION: 97 % | SYSTOLIC BLOOD PRESSURE: 116 MMHG | DIASTOLIC BLOOD PRESSURE: 80 MMHG | RESPIRATION RATE: 16 BRPM | HEIGHT: 70 IN | BODY MASS INDEX: 27.03 KG/M2 | TEMPERATURE: 98 F | WEIGHT: 188.8 LBS | HEART RATE: 90 BPM

## 2019-07-31 DIAGNOSIS — I10 ESSENTIAL HYPERTENSION: Primary | ICD-10-CM

## 2019-07-31 DIAGNOSIS — J01.00 SUBACUTE MAXILLARY SINUSITIS: ICD-10-CM

## 2019-07-31 PROCEDURE — 99213 OFFICE O/P EST LOW 20 MIN: CPT | Performed by: FAMILY MEDICINE

## 2019-07-31 RX ORDER — AZITHROMYCIN 250 MG/1
TABLET, FILM COATED ORAL
Qty: 6 TABLET | Refills: 0 | Status: SHIPPED | OUTPATIENT
Start: 2019-07-31 | End: 2019-11-26

## 2019-07-31 RX ORDER — LOSARTAN POTASSIUM 50 MG/1
50 TABLET ORAL DAILY
Qty: 90 TABLET | Refills: 1 | Status: SHIPPED | OUTPATIENT
Start: 2019-07-31 | End: 2020-01-31

## 2019-07-31 NOTE — PROGRESS NOTES
"Subjective   Iam Blanco is a 49 y.o. male.     Hypertension   This is a chronic problem. The current episode started more than 1 year ago. The problem is unchanged. The problem is controlled. Pertinent negatives include no chest pain, headaches, palpitations or shortness of breath. Risk factors for coronary artery disease include family history, male gender and smoking/tobacco exposure. Current antihypertension treatment includes angiotensin blockers and lifestyle changes. There is no history of angina, kidney disease or CAD/MI.   Sore Throat    This is a new problem. The current episode started in the past 7 days. The problem has been unchanged. There has been no fever. The pain is mild (Very scratchy throat particularly first thing in the morning). Pertinent negatives include no congestion, diarrhea, headaches, shortness of breath or vomiting. He has tried cool liquids for the symptoms. The treatment provided mild relief.        The following portions of the patient's history were reviewed and updated as appropriate: allergies, current medications, past social history and problem list.    Review of Systems   Constitutional: Negative for activity change and fatigue.   HENT: Positive for postnasal drip and sore throat. Negative for congestion.    Eyes: Negative for pain and visual disturbance.   Respiratory: Negative for chest tightness and shortness of breath.    Cardiovascular: Negative for chest pain, palpitations and leg swelling.   Gastrointestinal: Negative for diarrhea, nausea and vomiting.   Genitourinary: Negative for dysuria and hematuria.   Neurological: Negative for dizziness, syncope and headaches.       Objective   /80   Pulse 90   Temp 98 °F (36.7 °C)   Resp 16   Ht 177.8 cm (70\")   Wt 85.6 kg (188 lb 12.8 oz)   SpO2 97%   BMI 27.09 kg/m²   Physical Exam   Constitutional: He is oriented to person, place, and time. He appears well-developed and well-nourished. He is cooperative. "   HENT:   Head: Normocephalic.   Right Ear: External ear normal.   Left Ear: External ear normal.   Nose: Nose normal.   Mouth/Throat: Oropharynx is clear and moist.   Cloudy postnasal drip   Eyes: Conjunctivae are normal. Pupils are equal, round, and reactive to light. No scleral icterus.   Neck: Neck supple. Carotid bruit is not present. No thyromegaly present.   Cardiovascular: Normal rate, regular rhythm and normal heart sounds.   Pulmonary/Chest: Effort normal and breath sounds normal.   Abdominal: There is no hepatosplenomegaly.   Musculoskeletal: Normal range of motion. He exhibits no edema.   Neurological: He is alert and oriented to person, place, and time.   No focal deficits no lateralizing signs   Skin: Skin is warm and dry. No rash noted.   Psychiatric: He has a normal mood and affect. Cognition and memory are normal.   Nursing note and vitals reviewed.      Assessment/Plan   Problem List Items Addressed This Visit        Active Problems    Essential hypertension - Primary    Relevant Medications    losartan (COZAAR) 50 MG tablet      Other Visit Diagnoses     Subacute maxillary sinusitis        Relevant Medications    azithromycin (ZITHROMAX) 250 MG tablet          New Medications Ordered This Visit   Medications   • losartan (COZAAR) 50 MG tablet     Sig: Take 1 tablet by mouth Daily.     Dispense:  90 tablet     Refill:  1   • azithromycin (ZITHROMAX) 250 MG tablet     Sig: Take 2 tablets the first day, then 1 tablet daily for 4 days.     Dispense:  6 tablet     Refill:  0

## 2019-11-26 ENCOUNTER — OFFICE VISIT (OUTPATIENT)
Dept: FAMILY MEDICINE CLINIC | Facility: CLINIC | Age: 49
End: 2019-11-26

## 2019-11-26 VITALS
BODY MASS INDEX: 27.8 KG/M2 | HEIGHT: 70 IN | HEART RATE: 65 BPM | SYSTOLIC BLOOD PRESSURE: 110 MMHG | WEIGHT: 194.2 LBS | OXYGEN SATURATION: 97 % | RESPIRATION RATE: 16 BRPM | DIASTOLIC BLOOD PRESSURE: 72 MMHG

## 2019-11-26 DIAGNOSIS — Z12.5 PROSTATE CANCER SCREENING: ICD-10-CM

## 2019-11-26 DIAGNOSIS — I10 ESSENTIAL HYPERTENSION: Primary | ICD-10-CM

## 2019-11-26 LAB
ALBUMIN SERPL-MCNC: 5.3 G/DL (ref 3.5–5.2)
ALBUMIN/GLOB SERPL: 2 G/DL
ALP SERPL-CCNC: 59 U/L (ref 39–117)
ALT SERPL W P-5'-P-CCNC: 49 U/L (ref 1–41)
ANION GAP SERPL CALCULATED.3IONS-SCNC: 9.3 MMOL/L (ref 5–15)
AST SERPL-CCNC: 28 U/L (ref 1–40)
BILIRUB SERPL-MCNC: 0.9 MG/DL (ref 0.2–1.2)
BUN BLD-MCNC: 13 MG/DL (ref 6–20)
BUN/CREAT SERPL: 12 (ref 7–25)
CALCIUM SPEC-SCNC: 10.2 MG/DL (ref 8.6–10.5)
CHLORIDE SERPL-SCNC: 99 MMOL/L (ref 98–107)
CHOLEST SERPL-MCNC: 179 MG/DL (ref 0–200)
CO2 SERPL-SCNC: 28.7 MMOL/L (ref 22–29)
CREAT BLD-MCNC: 1.08 MG/DL (ref 0.76–1.27)
DEPRECATED RDW RBC AUTO: 39 FL (ref 37–54)
ERYTHROCYTE [DISTWIDTH] IN BLOOD BY AUTOMATED COUNT: 12.4 % (ref 12.3–15.4)
GFR SERPL CREATININE-BSD FRML MDRD: 73 ML/MIN/1.73
GLOBULIN UR ELPH-MCNC: 2.7 GM/DL
GLUCOSE BLD-MCNC: 106 MG/DL (ref 65–99)
HCT VFR BLD AUTO: 48.7 % (ref 37.5–51)
HDLC SERPL-MCNC: 48 MG/DL (ref 40–60)
HGB BLD-MCNC: 17.1 G/DL (ref 13–17.7)
LDLC SERPL CALC-MCNC: 114 MG/DL (ref 0–100)
LDLC/HDLC SERPL: 2.37 {RATIO}
MCH RBC QN AUTO: 30.5 PG (ref 26.6–33)
MCHC RBC AUTO-ENTMCNC: 35.1 G/DL (ref 31.5–35.7)
MCV RBC AUTO: 86.8 FL (ref 79–97)
PLATELET # BLD AUTO: 252 10*3/MM3 (ref 140–450)
PMV BLD AUTO: 12.5 FL (ref 6–12)
POTASSIUM BLD-SCNC: 5 MMOL/L (ref 3.5–5.2)
PROT SERPL-MCNC: 8 G/DL (ref 6–8.5)
PSA SERPL-MCNC: 0.49 NG/ML (ref 0–4)
RBC # BLD AUTO: 5.61 10*6/MM3 (ref 4.14–5.8)
SODIUM BLD-SCNC: 137 MMOL/L (ref 136–145)
TRIGL SERPL-MCNC: 86 MG/DL (ref 0–150)
TSH SERPL DL<=0.05 MIU/L-ACNC: 2.33 UIU/ML (ref 0.27–4.2)
VLDLC SERPL-MCNC: 17.2 MG/DL (ref 5–40)
WBC NRBC COR # BLD: 8.83 10*3/MM3 (ref 3.4–10.8)

## 2019-11-26 PROCEDURE — 80061 LIPID PANEL: CPT | Performed by: FAMILY MEDICINE

## 2019-11-26 PROCEDURE — 99213 OFFICE O/P EST LOW 20 MIN: CPT | Performed by: FAMILY MEDICINE

## 2019-11-26 PROCEDURE — 85027 COMPLETE CBC AUTOMATED: CPT | Performed by: FAMILY MEDICINE

## 2019-11-26 PROCEDURE — 84443 ASSAY THYROID STIM HORMONE: CPT | Performed by: FAMILY MEDICINE

## 2019-11-26 PROCEDURE — G0103 PSA SCREENING: HCPCS | Performed by: FAMILY MEDICINE

## 2019-11-26 PROCEDURE — 80053 COMPREHEN METABOLIC PANEL: CPT | Performed by: FAMILY MEDICINE

## 2019-11-26 NOTE — PROGRESS NOTES
"Subjective   Iam Blanco is a 49 y.o. male.     Hypertension   This is a chronic problem. The current episode started more than 1 year ago. The problem is unchanged. The problem is controlled. Pertinent negatives include no chest pain, headaches, palpitations or shortness of breath. Risk factors for coronary artery disease include family history, male gender and smoking/tobacco exposure. Current antihypertension treatment includes angiotensin blockers and lifestyle changes. There is no history of angina, kidney disease or CAD/MI.        The following portions of the patient's history were reviewed and updated as appropriate: allergies, current medications, past social history and problem list.    Review of Systems   Constitutional: Negative for activity change and fatigue.   HENT: Negative for congestion and sore throat.    Eyes: Negative for pain and visual disturbance.   Respiratory: Negative for chest tightness and shortness of breath.    Cardiovascular: Negative for chest pain, palpitations and leg swelling.   Gastrointestinal: Negative for diarrhea, nausea and vomiting.   Genitourinary: Negative for dysuria and hematuria.   Neurological: Negative for dizziness, syncope and headaches.       Objective   /72   Pulse 65   Resp 16   Ht 177.8 cm (70\")   Wt 88.1 kg (194 lb 3.2 oz)   SpO2 97%   BMI 27.86 kg/m²   Physical Exam   Constitutional: He is oriented to person, place, and time. He appears well-developed and well-nourished. He is cooperative.   HENT:   Head: Normocephalic.   Right Ear: External ear normal.   Left Ear: External ear normal.   Nose: Nose normal.   Mouth/Throat: Oropharynx is clear and moist.   Eyes: Conjunctivae are normal. Pupils are equal, round, and reactive to light. No scleral icterus.   Neck: Neck supple. No JVD present. Carotid bruit is not present. No thyromegaly present.   Cardiovascular: Normal rate, regular rhythm, normal heart sounds and intact distal pulses. "   Pulmonary/Chest: Effort normal and breath sounds normal.   Abdominal: There is no hepatosplenomegaly.   Musculoskeletal: Normal range of motion. He exhibits no edema.   Lymphadenopathy:     He has no cervical adenopathy.   Neurological: He is alert and oriented to person, place, and time.   No focal deficits no lateralizing signs   Skin: Skin is warm and dry. No rash noted.   Psychiatric: He has a normal mood and affect. Cognition and memory are normal.   Nursing note and vitals reviewed.      Assessment/Plan   Problem List Items Addressed This Visit        Active Problems    Essential hypertension - Primary    Relevant Orders    Comprehensive Metabolic Panel    Lipid Panel    TSH    CBC (No Diff)      Other Visit Diagnoses     Prostate cancer screening        Relevant Orders    PSA Screen          No orders of the defined types were placed in this encounter.

## 2020-01-31 DIAGNOSIS — I10 ESSENTIAL HYPERTENSION: ICD-10-CM

## 2020-01-31 RX ORDER — LOSARTAN POTASSIUM 50 MG/1
TABLET ORAL
Qty: 90 TABLET | Refills: 1 | Status: SHIPPED | OUTPATIENT
Start: 2020-01-31 | End: 2020-07-31

## 2020-04-07 RX ORDER — LEVOCETIRIZINE DIHYDROCHLORIDE 5 MG/1
TABLET, FILM COATED ORAL
Qty: 90 TABLET | Refills: 3 | Status: SHIPPED | OUTPATIENT
Start: 2020-04-07 | End: 2021-04-08

## 2020-07-31 DIAGNOSIS — I10 ESSENTIAL HYPERTENSION: ICD-10-CM

## 2020-07-31 RX ORDER — LOSARTAN POTASSIUM 50 MG/1
TABLET ORAL
Qty: 90 TABLET | Refills: 1 | Status: SHIPPED | OUTPATIENT
Start: 2020-07-31 | End: 2021-02-17

## 2021-01-15 ENCOUNTER — OFFICE VISIT (OUTPATIENT)
Dept: FAMILY MEDICINE CLINIC | Facility: CLINIC | Age: 51
End: 2021-01-15

## 2021-01-15 VITALS
OXYGEN SATURATION: 98 % | RESPIRATION RATE: 20 BRPM | BODY MASS INDEX: 27.63 KG/M2 | HEART RATE: 68 BPM | HEIGHT: 70 IN | DIASTOLIC BLOOD PRESSURE: 92 MMHG | WEIGHT: 193 LBS | TEMPERATURE: 97.8 F | SYSTOLIC BLOOD PRESSURE: 130 MMHG

## 2021-01-15 DIAGNOSIS — J01.00 ACUTE NON-RECURRENT MAXILLARY SINUSITIS: Primary | ICD-10-CM

## 2021-01-15 PROCEDURE — 99213 OFFICE O/P EST LOW 20 MIN: CPT | Performed by: NURSE PRACTITIONER

## 2021-01-15 RX ORDER — FLUTICASONE PROPIONATE 50 MCG
2 SPRAY, SUSPENSION (ML) NASAL DAILY
Qty: 1 BOTTLE | Refills: 11 | Status: SHIPPED | OUTPATIENT
Start: 2021-01-15 | End: 2021-12-30

## 2021-01-15 RX ORDER — AMOXICILLIN AND CLAVULANATE POTASSIUM 875; 125 MG/1; MG/1
1 TABLET, FILM COATED ORAL 2 TIMES DAILY
Qty: 20 TABLET | Refills: 0 | Status: SHIPPED | OUTPATIENT
Start: 2021-01-15 | End: 2021-01-25

## 2021-01-17 NOTE — PATIENT INSTRUCTIONS
Sinusitis, Adult  Sinusitis is inflammation of your sinuses. Sinuses are hollow spaces in the bones around your face. Your sinuses are located:  · Around your eyes.  · In the middle of your forehead.  · Behind your nose.  · In your cheekbones.  Mucus normally drains out of your sinuses. When your nasal tissues become inflamed or swollen, mucus can become trapped or blocked. This allows bacteria, viruses, and fungi to grow, which leads to infection. Most infections of the sinuses are caused by a virus.  Sinusitis can develop quickly. It can last for up to 4 weeks (acute) or for more than 12 weeks (chronic). Sinusitis often develops after a cold.  What are the causes?  This condition is caused by anything that creates swelling in the sinuses or stops mucus from draining. This includes:  · Allergies.  · Asthma.  · Infection from bacteria or viruses.  · Deformities or blockages in your nose or sinuses.  · Abnormal growths in the nose (nasal polyps).  · Pollutants, such as chemicals or irritants in the air.  · Infection from fungi (rare).  What increases the risk?  You are more likely to develop this condition if you:  · Have a weak body defense system (immune system).  · Do a lot of swimming or diving.  · Overuse nasal sprays.  · Smoke.  What are the signs or symptoms?  The main symptoms of this condition are pain and a feeling of pressure around the affected sinuses. Other symptoms include:  · Stuffy nose or congestion.  · Thick drainage from your nose.  · Swelling and warmth over the affected sinuses.  · Headache.  · Upper toothache.  · A cough that may get worse at night.  · Extra mucus that collects in the throat or the back of the nose (postnasal drip).  · Decreased sense of smell and taste.  · Fatigue.  · A fever.  · Sore throat.  · Bad breath.  How is this diagnosed?  This condition is diagnosed based on:  · Your symptoms.  · Your medical history.  · A physical exam.  · Tests to find out if your condition is  acute or chronic. This may include:  ? Checking your nose for nasal polyps.  ? Viewing your sinuses using a device that has a light (endoscope).  ? Testing for allergies or bacteria.  ? Imaging tests, such as an MRI or CT scan.  In rare cases, a bone biopsy may be done to rule out more serious types of fungal sinus disease.  How is this treated?  Treatment for sinusitis depends on the cause and whether your condition is chronic or acute.  · If caused by a virus, your symptoms should go away on their own within 10 days. You may be given medicines to relieve symptoms. They include:  ? Medicines that shrink swollen nasal passages (topical intranasal decongestants).  ? Medicines that treat allergies (antihistamines).  ? A spray that eases inflammation of the nostrils (topical intranasal corticosteroids).  ? Rinses that help get rid of thick mucus in your nose (nasal saline washes).  · If caused by bacteria, your health care provider may recommend waiting to see if your symptoms improve. Most bacterial infections will get better without antibiotic medicine. You may be given antibiotics if you have:  ? A severe infection.  ? A weak immune system.  · If caused by narrow nasal passages or nasal polyps, you may need to have surgery.  Follow these instructions at home:  Medicines  · Take, use, or apply over-the-counter and prescription medicines only as told by your health care provider. These may include nasal sprays.  · If you were prescribed an antibiotic medicine, take it as told by your health care provider. Do not stop taking the antibiotic even if you start to feel better.  Hydrate and humidify    · Drink enough fluid to keep your urine pale yellow. Staying hydrated will help to thin your mucus.  · Use a cool mist humidifier to keep the humidity level in your home above 50%.  · Inhale steam for 10-15 minutes, 3-4 times a day, or as told by your health care provider. You can do this in the bathroom while a hot shower is  running.  · Limit your exposure to cool or dry air.  Rest  · Rest as much as possible.  · Sleep with your head raised (elevated).  · Make sure you get enough sleep each night.  General instructions    · Apply a warm, moist washcloth to your face 3-4 times a day or as told by your health care provider. This will help with discomfort.  · Wash your hands often with soap and water to reduce your exposure to germs. If soap and water are not available, use hand .  · Do not smoke. Avoid being around people who are smoking (secondhand smoke).  · Keep all follow-up visits as told by your health care provider. This is important.  Contact a health care provider if:  · You have a fever.  · Your symptoms get worse.  · Your symptoms do not improve within 10 days.  Get help right away if:  · You have a severe headache.  · You have persistent vomiting.  · You have severe pain or swelling around your face or eyes.  · You have vision problems.  · You develop confusion.  · Your neck is stiff.  · You have trouble breathing.  Summary  · Sinusitis is soreness and inflammation of your sinuses. Sinuses are hollow spaces in the bones around your face.  · This condition is caused by nasal tissues that become inflamed or swollen. The swelling traps or blocks the flow of mucus. This allows bacteria, viruses, and fungi to grow, which leads to infection.  · If you were prescribed an antibiotic medicine, take it as told by your health care provider. Do not stop taking the antibiotic even if you start to feel better.  · Keep all follow-up visits as told by your health care provider. This is important.  This information is not intended to replace advice given to you by your health care provider. Make sure you discuss any questions you have with your health care provider.  Document Revised: 05/20/2019 Document Reviewed: 05/20/2019  [employer] Patient Education © 2020 [employer]  Amoxicillin; Clavulanic Acid Tablets  What is this  medicine?  AMOXICILLIN; CLAVULANIC ACID (a mox i CRISTOPHER in; KRISTINE gonzalez lisa ic AS id) is a penicillin antibiotic. It treats some infections caused by bacteria. It will not work for colds, the flu, or other viruses.  This medicine may be used for other purposes; ask your health care provider or pharmacist if you have questions.  COMMON BRAND NAME(S): Augmentin  What should I tell my health care provider before I take this medicine?  They need to know if you have any of these conditions:  · bowel disease, like colitis  · kidney disease  · liver disease  · mononucleosis  · an unusual or allergic reaction to amoxicillin, penicillin, cephalosporin, other antibiotics, clavulanic acid, other medicines, foods, dyes, or preservatives  · pregnant or trying to get pregnant  · breast-feeding  How should I use this medicine?  Take this drug by mouth. Take it as directed on the prescription label at the same time every day. Take it with food at the start of a meal or snack. Take all of this drug unless your health care provider tells you to stop it early. Keep taking it even if you think you are better.  Talk to your health care provider about the use of this drug in children. While it may be prescribed for selected conditions, precautions do apply.  Overdosage: If you think you have taken too much of this medicine contact a poison control center or emergency room at once.  NOTE: This medicine is only for you. Do not share this medicine with others.  What if I miss a dose?  If you miss a dose, take it as soon as you can. If it is almost time for your next dose, take only that dose. Do not take double or extra doses.  What may interact with this medicine?  · allopurinol  · anticoagulants  · birth control pills  · methotrexate  · probenecid  This list may not describe all possible interactions. Give your health care provider a list of all the medicines, herbs, non-prescription drugs, or dietary supplements you use. Also tell them if you  smoke, drink alcohol, or use illegal drugs. Some items may interact with your medicine.  What should I watch for while using this medicine?  Tell your doctor or healthcare provider if your symptoms do not improve.  This medicine may cause serious skin reactions. They can happen weeks to months after starting the medicine. Contact your healthcare provider right away if you notice fevers or flu-like symptoms with a rash. The rash may be red or purple and then turn into blisters or peeling of the skin. Or, you might notice a red rash with swelling of the face, lips or lymph nodes in your neck or under your arms.  Do not treat diarrhea with over the counter products. Contact your doctor if you have diarrhea that lasts more than 2 days or if it is severe and watery.  If you have diabetes, you may get a false-positive result for sugar in your urine. Check with your doctor or healthcare provider.  Birth control pills may not work properly while you are taking this medicine. Talk to your doctor about using an extra method of birth control.  What side effects may I notice from receiving this medicine?  Side effects that you should report to your doctor or health care professional as soon as possible:  · allergic reactions like skin rash, itching or hives, swelling of the face, lips, or tongue  · breathing problems  · dark urine  · fever or chills, sore throat  · redness, blistering, peeling, or loosening of the skin, including inside the mouth  · seizures  · trouble passing urine or change in the amount of urine  · unusual bleeding, bruising  · unusually weak or tired  · white patches or sores in the mouth or throat  Side effects that usually do not require medical attention (report to your doctor or health care professional if they continue or are bothersome):  · diarrhea  · dizziness  · headache  · nausea, vomiting  · stomach upset  · vaginal or anal irritation  This list may not describe all possible side effects. Call  your doctor for medical advice about side effects. You may report side effects to FDA at 3-360-FBT-9268.  Where should I keep my medicine?  Keep out of the reach of children and pets.  Store at room temperature between 20 and 25 degrees C (68 and 77 degrees F). Throw away any unused drug after the expiration date.  NOTE: This sheet is a summary. It may not cover all possible information. If you have questions about this medicine, talk to your doctor, pharmacist, or health care provider.  © 2020 [employer] (2020-07-20 11:55:53)    Sinusitis, Adult  Sinusitis is inflammation of your sinuses. Sinuses are hollow spaces in the bones around your face. Your sinuses are located:  · Around your eyes.  · In the middle of your forehead.  · Behind your nose.  · In your cheekbones.  Mucus normally drains out of your sinuses. When your nasal tissues become inflamed or swollen, mucus can become trapped or blocked. This allows bacteria, viruses, and fungi to grow, which leads to infection. Most infections of the sinuses are caused by a virus.  Sinusitis can develop quickly. It can last for up to 4 weeks (acute) or for more than 12 weeks (chronic). Sinusitis often develops after a cold.  What are the causes?  This condition is caused by anything that creates swelling in the sinuses or stops mucus from draining. This includes:  · Allergies.  · Asthma.  · Infection from bacteria or viruses.  · Deformities or blockages in your nose or sinuses.  · Abnormal growths in the nose (nasal polyps).  · Pollutants, such as chemicals or irritants in the air.  · Infection from fungi (rare).  What increases the risk?  You are more likely to develop this condition if you:  · Have a weak body defense system (immune system).  · Do a lot of swimming or diving.  · Overuse nasal sprays.  · Smoke.  What are the signs or symptoms?  The main symptoms of this condition are pain and a feeling of pressure around the affected sinuses. Other symptoms  include:  · Stuffy nose or congestion.  · Thick drainage from your nose.  · Swelling and warmth over the affected sinuses.  · Headache.  · Upper toothache.  · A cough that may get worse at night.  · Extra mucus that collects in the throat or the back of the nose (postnasal drip).  · Decreased sense of smell and taste.  · Fatigue.  · A fever.  · Sore throat.  · Bad breath.  How is this diagnosed?  This condition is diagnosed based on:  · Your symptoms.  · Your medical history.  · A physical exam.  · Tests to find out if your condition is acute or chronic. This may include:  ? Checking your nose for nasal polyps.  ? Viewing your sinuses using a device that has a light (endoscope).  ? Testing for allergies or bacteria.  ? Imaging tests, such as an MRI or CT scan.  In rare cases, a bone biopsy may be done to rule out more serious types of fungal sinus disease.  How is this treated?  Treatment for sinusitis depends on the cause and whether your condition is chronic or acute.  · If caused by a virus, your symptoms should go away on their own within 10 days. You may be given medicines to relieve symptoms. They include:  ? Medicines that shrink swollen nasal passages (topical intranasal decongestants).  ? Medicines that treat allergies (antihistamines).  ? A spray that eases inflammation of the nostrils (topical intranasal corticosteroids).  ? Rinses that help get rid of thick mucus in your nose (nasal saline washes).  · If caused by bacteria, your health care provider may recommend waiting to see if your symptoms improve. Most bacterial infections will get better without antibiotic medicine. You may be given antibiotics if you have:  ? A severe infection.  ? A weak immune system.  · If caused by narrow nasal passages or nasal polyps, you may need to have surgery.  Follow these instructions at home:  Medicines  · Take, use, or apply over-the-counter and prescription medicines only as told by your health care provider. These  may include nasal sprays.  · If you were prescribed an antibiotic medicine, take it as told by your health care provider. Do not stop taking the antibiotic even if you start to feel better.  Hydrate and humidify    · Drink enough fluid to keep your urine pale yellow. Staying hydrated will help to thin your mucus.  · Use a cool mist humidifier to keep the humidity level in your home above 50%.  · Inhale steam for 10-15 minutes, 3-4 times a day, or as told by your health care provider. You can do this in the bathroom while a hot shower is running.  · Limit your exposure to cool or dry air.  Rest  · Rest as much as possible.  · Sleep with your head raised (elevated).  · Make sure you get enough sleep each night.  General instructions    · Apply a warm, moist washcloth to your face 3-4 times a day or as told by your health care provider. This will help with discomfort.  · Wash your hands often with soap and water to reduce your exposure to germs. If soap and water are not available, use hand .  · Do not smoke. Avoid being around people who are smoking (secondhand smoke).  · Keep all follow-up visits as told by your health care provider. This is important.  Contact a health care provider if:  · You have a fever.  · Your symptoms get worse.  · Your symptoms do not improve within 10 days.  Get help right away if:  · You have a severe headache.  · You have persistent vomiting.  · You have severe pain or swelling around your face or eyes.  · You have vision problems.  · You develop confusion.  · Your neck is stiff.  · You have trouble breathing.  Summary  · Sinusitis is soreness and inflammation of your sinuses. Sinuses are hollow spaces in the bones around your face.  · This condition is caused by nasal tissues that become inflamed or swollen. The swelling traps or blocks the flow of mucus. This allows bacteria, viruses, and fungi to grow, which leads to infection.  · If you were prescribed an antibiotic medicine,  take it as told by your health care provider. Do not stop taking the antibiotic even if you start to feel better.  · Keep all follow-up visits as told by your health care provider. This is important.  This information is not intended to replace advice given to you by your health care provider. Make sure you discuss any questions you have with your health care provider.  Document Revised: 05/20/2019 Document Reviewed: 05/20/2019  [employer] Patient Education © 2020 [employer]

## 2021-01-17 NOTE — PROGRESS NOTES
Follow Up Office Note     Patient Name: Iam Blanco  : 1970   MRN: [id number]     Chief Complaint:    Chief Complaint   Patient presents with   • Tinnitus     pulsing ear       History of Present Illness: Iam Blanco is a 50 y.o. male who presents today with c/o sinus congestion, post-nasal drainage, fluttering sound in ears.    Sinus Problem  This is a new problem. The current episode started 1 to 4 weeks ago. The problem has been gradually worsening since onset. There has been no fever. The pain is moderate. Associated symptoms include congestion, headaches, sinus pressure and a sore throat. Pertinent negatives include no chills, coughing, diaphoresis or shortness of breath. Ear pain: ear pressure. Past treatments include nothing.        Subjective      Review of Systems:   Review of Systems   Constitutional: Positive for fatigue. Negative for activity change, appetite change, chills, diaphoresis and fever.   HENT: Positive for congestion, postnasal drip, sinus pressure, sinus pain and sore throat. Negative for ear discharge, trouble swallowing and voice change. Ear pain: ear pressure.    Respiratory: Negative for cough, chest tightness and shortness of breath.    Cardiovascular: Negative.    Gastrointestinal: Negative.    Neurological: Positive for headaches. Negative for dizziness.        Past Medical History:   Past Medical History:   Diagnosis Date   • Anxiety    • H/O esophageal reflux    • Hypertension    • Influenza    • Tobacco abuse    • Toe pain          Medications:     Current Outpatient Medications:   •  levocetirizine (XYZAL) 5 MG tablet, TAKE 1 TABLET BY MOUTH EVERY DAY IN THE EVENING, Disp: 90 tablet, Rfl: 3  •  losartan (COZAAR) 50 MG tablet, TAKE 1 TABLET BY MOUTH EVERY DAY, Disp: 90 tablet, Rfl: 1  •  amoxicillin-clavulanate (AUGMENTIN) 875-125 MG per tablet, Take 1 tablet by mouth 2 (Two) Times a Day for 10 days., Disp: 20 tablet, Rfl: 0  •  fluticasone (Flonase) 50  "MCG/ACT nasal spray, 2 sprays into the nostril(s) as directed by provider Daily., Disp: 1 bottle, Rfl: 11    Allergies:   Allergies   Allergen Reactions   • Sulfa Antibiotics          Objective     Physical Exam:  Vital Signs:   Vitals:    01/15/21 1153   BP: 130/92   BP Location: Right arm   Patient Position: Sitting   Cuff Size: Adult   Pulse: 68   Resp: 20   Temp: 97.8 °F (36.6 °C)   SpO2: 98%   Weight: 87.5 kg (193 lb)   Height: 177.8 cm (70\")   PainSc: 0-No pain     Body mass index is 27.69 kg/m².     Physical Exam  Vitals signs and nursing note reviewed.   Constitutional:       General: He is not in acute distress.     Appearance: Normal appearance. He is well-developed. He is not ill-appearing, toxic-appearing or diaphoretic.   HENT:      Head: Normocephalic and atraumatic.      Right Ear: External ear normal. A middle ear effusion is present. Tympanic membrane is bulging.      Left Ear: External ear normal. A middle ear effusion is present. Tympanic membrane is bulging.      Nose: Mucosal edema and congestion present.      Right Turbinates: Swollen.      Left Turbinates: Swollen.      Mouth/Throat:      Lips: Pink.      Mouth: Mucous membranes are moist.      Pharynx: Uvula midline. Posterior oropharyngeal erythema present.   Neck:      Musculoskeletal: Normal range of motion and neck supple. No neck rigidity or muscular tenderness.   Cardiovascular:      Rate and Rhythm: Normal rate and regular rhythm.      Heart sounds: Normal heart sounds.   Pulmonary:      Effort: Pulmonary effort is normal. No respiratory distress.      Breath sounds: Normal breath sounds. No stridor. No wheezing.   Lymphadenopathy:      Cervical: No cervical adenopathy.   Skin:     General: Skin is warm and dry.   Neurological:      Mental Status: He is alert and oriented to person, place, and time.   Psychiatric:         Mood and Affect: Mood normal.         Behavior: Behavior normal. Behavior is cooperative.         Thought Content: " Thought content normal.         Judgment: Judgment normal.         Assessment / Plan      Assessment/Plan:   Diagnoses and all orders for this visit:    1. Acute non-recurrent maxillary sinusitis (Primary)  -     amoxicillin-clavulanate (AUGMENTIN) 875-125 MG per tablet; Take 1 tablet by mouth 2 (Two) Times a Day for 10 days.  Dispense: 20 tablet; Refill: 0  -     fluticasone (Flonase) 50 MCG/ACT nasal spray; 2 sprays into the nostril(s) as directed by provider Daily.  Dispense: 1 bottle; Refill: 11       Follow Up:   PRN and at next scheduled appointment(s) with PCP Dr. Tanner    Discussed the nature of the medical condition(s) risks, complications, implications, management, safe and proper use of medications. Encouraged medication compliance, and keeping scheduled follow up appointments with me and any other providers.      RTC if symptoms fail to improve, to ER if symptoms worsen.      RYANN Fuentes  [facility]       Please note that portions of this note may have been completed with a voice recognition program. Efforts were made to edit the dictations, but occasionally words are mistranscribed.

## 2021-02-16 DIAGNOSIS — I10 ESSENTIAL HYPERTENSION: ICD-10-CM

## 2021-02-17 RX ORDER — LOSARTAN POTASSIUM 50 MG/1
TABLET ORAL
Qty: 90 TABLET | Refills: 0 | Status: SHIPPED | OUTPATIENT
Start: 2021-02-17 | End: 2021-05-11

## 2021-04-08 RX ORDER — LEVOCETIRIZINE DIHYDROCHLORIDE 5 MG/1
TABLET, FILM COATED ORAL
Qty: 90 TABLET | Refills: 1 | Status: SHIPPED | OUTPATIENT
Start: 2021-04-08 | End: 2021-09-14

## 2021-05-11 DIAGNOSIS — I10 ESSENTIAL HYPERTENSION: ICD-10-CM

## 2021-05-11 RX ORDER — LOSARTAN POTASSIUM 50 MG/1
TABLET ORAL
Qty: 90 TABLET | Refills: 0 | Status: SHIPPED | OUTPATIENT
Start: 2021-05-11 | End: 2021-08-03

## 2021-08-03 DIAGNOSIS — I10 ESSENTIAL HYPERTENSION: ICD-10-CM

## 2021-08-03 RX ORDER — LOSARTAN POTASSIUM 50 MG/1
TABLET ORAL
Qty: 30 TABLET | Refills: 0 | Status: SHIPPED | OUTPATIENT
Start: 2021-08-03 | End: 2021-09-14 | Stop reason: SDUPTHER

## 2021-09-14 DIAGNOSIS — I10 ESSENTIAL HYPERTENSION: ICD-10-CM

## 2021-09-14 RX ORDER — LOSARTAN POTASSIUM 50 MG/1
50 TABLET ORAL DAILY
Qty: 30 TABLET | Refills: 0 | Status: SHIPPED | OUTPATIENT
Start: 2021-09-14 | End: 2021-09-21 | Stop reason: SDUPTHER

## 2021-09-14 RX ORDER — LEVOCETIRIZINE DIHYDROCHLORIDE 5 MG/1
TABLET, FILM COATED ORAL
Qty: 90 TABLET | Refills: 1 | Status: SHIPPED | OUTPATIENT
Start: 2021-09-14 | End: 2022-03-08

## 2021-09-14 NOTE — TELEPHONE ENCOUNTER
Caller: Iam Blanco    Relationship: Self    Best call back number: [phone]    Medication needed:   Requested Prescriptions     Pending Prescriptions Disp Refills   • losartan (COZAAR) 50 MG tablet 30 tablet 0     Sig: Take 1 tablet by mouth Daily.       When do you need the refill by: ASAP    What additional details did the patient provide when requesting the medication:  HE IS OUT OF MEDICATION.  YOU ORDERED HIM A 30 DAY SUPPLY HOWEVER HIS INSURANCE WON'T PAY FOR ANYTHING EXCEPT 90 DAY SUPPLY. HE HAS SCHEDULED AN APPOINTMENT WITH YOU FOR NEXT WEEK.    Does the patient have less than a 3 day supply:  [x] Yes  [] No    What is the patient's preferred pharmacy: [id number] - [place], [place] - [address] [phone] [facility] [phone]

## 2021-09-21 ENCOUNTER — OFFICE VISIT (OUTPATIENT)
Dept: FAMILY MEDICINE CLINIC | Facility: CLINIC | Age: 51
End: 2021-09-21

## 2021-09-21 VITALS
HEART RATE: 69 BPM | RESPIRATION RATE: 16 BRPM | HEIGHT: 70 IN | SYSTOLIC BLOOD PRESSURE: 122 MMHG | BODY MASS INDEX: 27.63 KG/M2 | DIASTOLIC BLOOD PRESSURE: 60 MMHG | WEIGHT: 193 LBS | OXYGEN SATURATION: 98 % | TEMPERATURE: 98.3 F

## 2021-09-21 DIAGNOSIS — I10 ESSENTIAL HYPERTENSION: Primary | ICD-10-CM

## 2021-09-21 DIAGNOSIS — Z12.5 PROSTATE CANCER SCREENING: ICD-10-CM

## 2021-09-21 PROCEDURE — 99213 OFFICE O/P EST LOW 20 MIN: CPT | Performed by: FAMILY MEDICINE

## 2021-09-21 RX ORDER — LOSARTAN POTASSIUM 50 MG/1
50 TABLET ORAL DAILY
Qty: 90 TABLET | Refills: 2 | Status: SHIPPED | OUTPATIENT
Start: 2021-09-21 | End: 2022-07-18

## 2021-10-06 ENCOUNTER — LAB (OUTPATIENT)
Dept: FAMILY MEDICINE CLINIC | Facility: CLINIC | Age: 51
End: 2021-10-06

## 2021-10-06 DIAGNOSIS — I10 ESSENTIAL HYPERTENSION: ICD-10-CM

## 2021-10-06 DIAGNOSIS — Z12.5 PROSTATE CANCER SCREENING: ICD-10-CM

## 2021-10-06 LAB
ALBUMIN SERPL-MCNC: 4.9 G/DL (ref 3.5–5.2)
ALBUMIN/GLOB SERPL: 2 G/DL
ALP SERPL-CCNC: 76 U/L (ref 39–117)
ALT SERPL W P-5'-P-CCNC: 38 U/L (ref 1–41)
ANION GAP SERPL CALCULATED.3IONS-SCNC: 10.1 MMOL/L (ref 5–15)
AST SERPL-CCNC: 34 U/L (ref 1–40)
BILIRUB SERPL-MCNC: 1 MG/DL (ref 0–1.2)
BUN SERPL-MCNC: 22 MG/DL (ref 6–20)
BUN/CREAT SERPL: 17.2 (ref 7–25)
CALCIUM SPEC-SCNC: 10 MG/DL (ref 8.6–10.5)
CHLORIDE SERPL-SCNC: 102 MMOL/L (ref 98–107)
CHOLEST SERPL-MCNC: 200 MG/DL (ref 0–200)
CO2 SERPL-SCNC: 26.9 MMOL/L (ref 22–29)
CREAT SERPL-MCNC: 1.28 MG/DL (ref 0.76–1.27)
DEPRECATED RDW RBC AUTO: 41.3 FL (ref 37–54)
ERYTHROCYTE [DISTWIDTH] IN BLOOD BY AUTOMATED COUNT: 12.6 % (ref 12.3–15.4)
GFR SERPL CREATININE-BSD FRML MDRD: 60 ML/MIN/1.73
GLOBULIN UR ELPH-MCNC: 2.5 GM/DL
GLUCOSE SERPL-MCNC: 104 MG/DL (ref 65–99)
HCT VFR BLD AUTO: 48.9 % (ref 37.5–51)
HDLC SERPL-MCNC: 43 MG/DL (ref 40–60)
HGB BLD-MCNC: 16.5 G/DL (ref 13–17.7)
LDLC SERPL CALC-MCNC: 139 MG/DL (ref 0–100)
LDLC/HDLC SERPL: 3.18 {RATIO}
MCH RBC QN AUTO: 29.8 PG (ref 26.6–33)
MCHC RBC AUTO-ENTMCNC: 33.7 G/DL (ref 31.5–35.7)
MCV RBC AUTO: 88.4 FL (ref 79–97)
PLATELET # BLD AUTO: 206 10*3/MM3 (ref 140–450)
PMV BLD AUTO: 14.5 FL (ref 6–12)
POTASSIUM SERPL-SCNC: 5.2 MMOL/L (ref 3.5–5.2)
PROT SERPL-MCNC: 7.4 G/DL (ref 6–8.5)
PSA SERPL-MCNC: 0.52 NG/ML (ref 0–4)
RBC # BLD AUTO: 5.53 10*6/MM3 (ref 4.14–5.8)
SODIUM SERPL-SCNC: 139 MMOL/L (ref 136–145)
TESTOST SERPL-MCNC: 189 NG/DL (ref 249–836)
TRIGL SERPL-MCNC: 101 MG/DL (ref 0–150)
TSH SERPL DL<=0.05 MIU/L-ACNC: 2.76 UIU/ML (ref 0.27–4.2)
VLDLC SERPL-MCNC: 18 MG/DL (ref 5–40)
WBC # BLD AUTO: 9.36 10*3/MM3 (ref 3.4–10.8)

## 2021-10-06 PROCEDURE — 84443 ASSAY THYROID STIM HORMONE: CPT | Performed by: FAMILY MEDICINE

## 2021-10-06 PROCEDURE — 84403 ASSAY OF TOTAL TESTOSTERONE: CPT | Performed by: FAMILY MEDICINE

## 2021-10-06 PROCEDURE — 80053 COMPREHEN METABOLIC PANEL: CPT | Performed by: FAMILY MEDICINE

## 2021-10-06 PROCEDURE — G0103 PSA SCREENING: HCPCS | Performed by: FAMILY MEDICINE

## 2021-10-06 PROCEDURE — 80061 LIPID PANEL: CPT | Performed by: FAMILY MEDICINE

## 2021-10-06 PROCEDURE — 85027 COMPLETE CBC AUTOMATED: CPT | Performed by: FAMILY MEDICINE

## 2021-10-13 ENCOUNTER — TELEMEDICINE (OUTPATIENT)
Dept: FAMILY MEDICINE CLINIC | Facility: CLINIC | Age: 51
End: 2021-10-13

## 2021-10-13 DIAGNOSIS — R79.89 LOW TESTOSTERONE IN MALE: ICD-10-CM

## 2021-10-13 DIAGNOSIS — R79.89 ABNORMAL BLOOD CREATININE LEVEL: Primary | ICD-10-CM

## 2021-10-13 PROCEDURE — 99213 OFFICE O/P EST LOW 20 MIN: CPT | Performed by: FAMILY MEDICINE

## 2021-10-14 ENCOUNTER — LAB (OUTPATIENT)
Dept: FAMILY MEDICINE CLINIC | Facility: CLINIC | Age: 51
End: 2021-10-14

## 2021-10-14 DIAGNOSIS — R79.89 LOW TESTOSTERONE IN MALE: ICD-10-CM

## 2021-10-14 DIAGNOSIS — R79.89 ABNORMAL BLOOD CREATININE LEVEL: ICD-10-CM

## 2021-10-14 PROCEDURE — 84403 ASSAY OF TOTAL TESTOSTERONE: CPT | Performed by: FAMILY MEDICINE

## 2021-10-14 PROCEDURE — 80048 BASIC METABOLIC PNL TOTAL CA: CPT | Performed by: FAMILY MEDICINE

## 2021-10-14 PROCEDURE — 84402 ASSAY OF FREE TESTOSTERONE: CPT | Performed by: FAMILY MEDICINE

## 2021-10-14 NOTE — PROGRESS NOTES
Chief Complaint  No chief complaint on file.    Subjective          Iam Blanco presents to [facility]  Review recent lab results.    Fatigue  This is a new problem. The current episode started more than 1 month ago. The problem occurs daily. The problem has been unchanged. Associated symptoms include fatigue. Pertinent negatives include no myalgias. Nothing aggravates the symptoms. He has tried nothing for the symptoms. The treatment provided no relief.       Objective   Vital Signs:   There were no vitals taken for this visit.    Physical Exam  Neurological:      Mental Status: He is alert.      video visit lasting 20 minutes  Result Review :                 Assessment and Plan    Diagnoses and all orders for this visit:    1. Abnormal blood creatinine level (Primary)  -     Basic Metabolic Panel; Future    2. Low testosterone in male  -     Testosterone (Free & Total), LC / MS; Future    avoid all nsaids increase hydration    Follow Up   No follow-ups on file.  Patient was given instructions and counseling regarding his condition or for health maintenance advice. Please see specific information pulled into the AVS if appropriate.

## 2021-10-15 LAB
ANION GAP SERPL CALCULATED.3IONS-SCNC: 11.2 MMOL/L (ref 5–15)
BUN SERPL-MCNC: 21 MG/DL (ref 6–20)
BUN/CREAT SERPL: 21.9 (ref 7–25)
CALCIUM SPEC-SCNC: 9.9 MG/DL (ref 8.6–10.5)
CHLORIDE SERPL-SCNC: 102 MMOL/L (ref 98–107)
CO2 SERPL-SCNC: 26.8 MMOL/L (ref 22–29)
CREAT SERPL-MCNC: 0.96 MG/DL (ref 0.76–1.27)
GFR SERPL CREATININE-BSD FRML MDRD: 83 ML/MIN/1.73
GLUCOSE SERPL-MCNC: 103 MG/DL (ref 65–99)
POTASSIUM SERPL-SCNC: 4.5 MMOL/L (ref 3.5–5.2)
SODIUM SERPL-SCNC: 140 MMOL/L (ref 136–145)

## 2021-10-19 ENCOUNTER — TELEPHONE (OUTPATIENT)
Dept: FAMILY MEDICINE CLINIC | Facility: CLINIC | Age: 51
End: 2021-10-19

## 2021-10-19 NOTE — TELEPHONE ENCOUNTER
Caller: Iam Blanco    Relationship: Self    Best call back number:[phone] (H)    Caller requesting test results: YES    What test was performed: BLOOD WORK     When was the test performed: 10/14/21    Where was the test performed: WITH IN [facility]

## 2021-10-20 LAB
TESTOST FREE SERPL-MCNC: 6.9 PG/ML (ref 6.8–21.5)
TESTOST SERPL-MCNC: 194.6 NG/DL (ref 264–916)

## 2021-11-02 DIAGNOSIS — R79.89 LOW TESTOSTERONE: Primary | ICD-10-CM

## 2021-12-29 DIAGNOSIS — J01.00 ACUTE NON-RECURRENT MAXILLARY SINUSITIS: ICD-10-CM

## 2021-12-30 RX ORDER — FLUTICASONE PROPIONATE 50 MCG
SPRAY, SUSPENSION (ML) NASAL
Qty: 16 ML | Refills: 5 | Status: SHIPPED | OUTPATIENT
Start: 2021-12-30 | End: 2022-08-12

## 2022-01-26 ENCOUNTER — OFFICE VISIT (OUTPATIENT)
Dept: ENDOCRINOLOGY | Facility: CLINIC | Age: 52
End: 2022-01-26

## 2022-01-26 ENCOUNTER — LAB (OUTPATIENT)
Dept: LAB | Facility: HOSPITAL | Age: 52
End: 2022-01-26

## 2022-01-26 VITALS
OXYGEN SATURATION: 95 % | WEIGHT: 200 LBS | SYSTOLIC BLOOD PRESSURE: 102 MMHG | DIASTOLIC BLOOD PRESSURE: 66 MMHG | HEIGHT: 70 IN | HEART RATE: 80 BPM | BODY MASS INDEX: 28.63 KG/M2

## 2022-01-26 DIAGNOSIS — R79.89 LOW TESTOSTERONE IN MALE: Primary | ICD-10-CM

## 2022-01-26 DIAGNOSIS — R79.89 LOW TESTOSTERONE IN MALE: ICD-10-CM

## 2022-01-26 LAB
ESTRADIOL SERPL HS-MCNC: 9.9 PG/ML
FSH SERPL-ACNC: 4.73 MIU/ML
LH SERPL-ACNC: 3.54 MIU/ML
PROLACTIN SERPL-MCNC: 6.81 NG/ML (ref 4.04–15.2)

## 2022-01-26 PROCEDURE — 99203 OFFICE O/P NEW LOW 30 MIN: CPT | Performed by: INTERNAL MEDICINE

## 2022-01-26 PROCEDURE — 83001 ASSAY OF GONADOTROPIN (FSH): CPT

## 2022-01-26 PROCEDURE — 82670 ASSAY OF TOTAL ESTRADIOL: CPT

## 2022-01-26 PROCEDURE — 83002 ASSAY OF GONADOTROPIN (LH): CPT

## 2022-01-26 PROCEDURE — 84146 ASSAY OF PROLACTIN: CPT

## 2022-01-26 NOTE — ASSESSMENT & PLAN NOTE
We need additional testing to determine if this is primary,secondary or tertiary hypogonadism and to guide our treatment plan

## 2022-01-26 NOTE — PROGRESS NOTES
"     Office Note      Date: 2022  Patient Name: Iam Blanco  MRN: [id number]  : 1970    Chief Complaint   Patient presents with   • Hypogonadism       History of Present Illness:   Iam Blanco is a 51 y.o. male who presents for Hypogonadism  he is seen as a new patient today  ----------------------------------------------  He went to the doc for a routine check up and mentioned increased fatigue and decreased libido  Testosterone levels have been low on 2 occasions.  He has no prior hx of low testosterone and has not ever taken testosterone.  He has children. The youngest is 12.  --------------------------------------------  No drastic vision change.  He has had a couple of concussions remotely.  He has normal sense of smell.  Had hernia surgery in  and his left testicle got really swollen and since then that testicle has been tender  No breast tenderness     He does not use opioids or marijuana     Subjective          Review of Systems:   Review of Systems   Constitutional: Positive for fatigue.   Genitourinary: Positive for testicular pain.       The following portions of the patient's history were reviewed and updated as appropriate: allergies, current medications, past family history, past medical history, past social history, past surgical history and problem list.    Objective     Visit Vitals  /66   Pulse 80   Ht 177.8 cm (70\")   Wt 90.7 kg (200 lb)   SpO2 95%   BMI 28.70 kg/m²       Labs:    CBC w/DIFF  Lab Results   Component Value Date    WBC 9.36 10/06/2021    RBC 5.53 10/06/2021    HGB 16.5 10/06/2021    HCT 48.9 10/06/2021    MCV 88.4 10/06/2021    MCH 29.8 10/06/2021    MCHC 33.7 10/06/2021    RDW 12.6 10/06/2021    RDWSD 41.3 10/06/2021    MPV 14.5 (H) 10/06/2021     10/06/2021    NEUTRORELPCT 50.1 2017    LYMPHORELPCT 38.0 2017    MONORELPCT 8.6 2017    EOSRELPCT 2.7 2017    BASORELPCT 0.4 2017    AUTOIGPER 0.2 2017    " NEUTROABS 4.83 08/31/2017    LYMPHSABS 3.66 08/31/2017    MONOSABS 0.83 08/31/2017    EOSABS 0.26 08/31/2017    BASOSABS 0.04 08/31/2017    AUTOIGNUM 0.02 08/31/2017       T4  No results found for: FREET4    TSH  No results found for: TSHBASE     Physical Exam:  Physical Exam  Vitals reviewed.   Constitutional:       General: He is not in acute distress.     Appearance: Normal appearance. He is normal weight. He is not ill-appearing, toxic-appearing or diaphoretic.   Cardiovascular:      Rate and Rhythm: Normal rate and regular rhythm.   Pulmonary:      Effort: Pulmonary effort is normal. No respiratory distress.   Neurological:      Mental Status: He is alert.   Psychiatric:         Mood and Affect: Mood normal.         Thought Content: Thought content normal.         Judgment: Judgment normal.         Assessment / Plan      Assessment & Plan:  Problem List Items Addressed This Visit        Other    Low testosterone in male - Primary    Overview     Has had 2 low testosterone levels         Current Assessment & Plan     We need additional testing to determine if this is primary,secondary or tertiary hypogonadism and to guide our treatment plan          Relevant Orders    Estradiol    FSH & LH    Prolactin           Kenny Ryan MD   01/26/2022

## 2022-01-27 ENCOUNTER — OFFICE VISIT (OUTPATIENT)
Dept: FAMILY MEDICINE CLINIC | Facility: CLINIC | Age: 52
End: 2022-01-27

## 2022-01-27 ENCOUNTER — PATIENT ROUNDING (BHMG ONLY) (OUTPATIENT)
Dept: ENDOCRINOLOGY | Facility: CLINIC | Age: 52
End: 2022-01-27

## 2022-01-27 VITALS
OXYGEN SATURATION: 98 % | WEIGHT: 198.6 LBS | TEMPERATURE: 97.5 F | BODY MASS INDEX: 28.43 KG/M2 | HEART RATE: 60 BPM | SYSTOLIC BLOOD PRESSURE: 118 MMHG | RESPIRATION RATE: 10 BRPM | HEIGHT: 70 IN | DIASTOLIC BLOOD PRESSURE: 76 MMHG

## 2022-01-27 DIAGNOSIS — I10 ESSENTIAL HYPERTENSION: Primary | ICD-10-CM

## 2022-01-27 PROCEDURE — 99213 OFFICE O/P EST LOW 20 MIN: CPT | Performed by: FAMILY MEDICINE

## 2022-01-27 NOTE — PROGRESS NOTES
A [employer] message has been sent to the patient for patient rounding with [facility]

## 2022-01-28 NOTE — PROGRESS NOTES
"Chief Complaint  Hypertension (f/u)    Subjective          Iam Blanco presents to [facility]  Hypertension  This is a chronic (doing well doing some exercise) problem. The current episode started more than 1 year ago. The problem is unchanged. The problem is controlled. Risk factors for coronary artery disease include male gender and family history. Current antihypertension treatment includes angiotensin blockers and lifestyle changes. The current treatment provides significant improvement. There are no compliance problems.        Objective   Vital Signs:   /76   Pulse 60   Temp 97.5 °F (36.4 °C)   Resp 10   Ht 177.8 cm (70\")   Wt 90.1 kg (198 lb 9.6 oz)   SpO2 98%   BMI 28.50 kg/m²     Physical Exam  Vitals and nursing note reviewed.   Constitutional:       Appearance: Normal appearance. He is well-developed.   HENT:      Head: Normocephalic and atraumatic.      Nose: Nose normal.   Eyes:      General: No scleral icterus.     Conjunctiva/sclera: Conjunctivae normal.      Pupils: Pupils are equal, round, and reactive to light.   Neck:      Thyroid: No thyromegaly.   Cardiovascular:      Rate and Rhythm: Normal rate and regular rhythm.   Pulmonary:      Effort: Pulmonary effort is normal.      Breath sounds: Normal breath sounds.   Musculoskeletal:         General: Normal range of motion.      Cervical back: Neck supple.      Right lower leg: No edema.      Left lower leg: No edema.   Skin:     General: Skin is warm and dry.      Findings: No rash.   Neurological:      General: No focal deficit present.      Mental Status: He is alert and oriented to person, place, and time.      Comments: No focal deficits no lateralizing signs   Psychiatric:         Behavior: Behavior is cooperative.        Result Review :                 Assessment and Plan    Diagnoses and all orders for this visit:    1. Essential hypertension (Primary)        Follow Up   Return in about 5 " months (around 6/27/2022) for Annual, fasting appt.  Patient was given instructions and counseling regarding his condition or for health maintenance advice. Please see specific information pulled into the AVS if appropriate.

## 2022-01-31 DIAGNOSIS — R79.89 LOW TESTOSTERONE IN MALE: Primary | ICD-10-CM

## 2022-01-31 RX ORDER — TESTOSTERONE ENANTHATE 200 MG/ML
1 INJECTION, SOLUTION INTRAMUSCULAR
Qty: 5 ML | Refills: 1 | Status: SHIPPED | OUTPATIENT
Start: 2022-01-31 | End: 2022-04-18

## 2022-02-02 ENCOUNTER — CLINICAL SUPPORT (OUTPATIENT)
Dept: ENDOCRINOLOGY | Facility: CLINIC | Age: 52
End: 2022-02-02

## 2022-02-02 DIAGNOSIS — R79.89 LOW TESTOSTERONE IN MALE: Primary | ICD-10-CM

## 2022-03-08 RX ORDER — LEVOCETIRIZINE DIHYDROCHLORIDE 5 MG/1
TABLET, FILM COATED ORAL
Qty: 90 TABLET | Refills: 1 | Status: SHIPPED | OUTPATIENT
Start: 2022-03-08 | End: 2022-06-13

## 2022-03-15 ENCOUNTER — OFFICE VISIT (OUTPATIENT)
Dept: ENDOCRINOLOGY | Facility: CLINIC | Age: 52
End: 2022-03-15

## 2022-03-15 ENCOUNTER — LAB (OUTPATIENT)
Dept: LAB | Facility: HOSPITAL | Age: 52
End: 2022-03-15

## 2022-03-15 VITALS
WEIGHT: 197 LBS | HEIGHT: 70 IN | OXYGEN SATURATION: 96 % | DIASTOLIC BLOOD PRESSURE: 62 MMHG | HEART RATE: 83 BPM | SYSTOLIC BLOOD PRESSURE: 110 MMHG | BODY MASS INDEX: 28.2 KG/M2

## 2022-03-15 DIAGNOSIS — R79.89 LOW TESTOSTERONE IN MALE: ICD-10-CM

## 2022-03-15 DIAGNOSIS — R79.89 LOW TESTOSTERONE IN MALE: Primary | ICD-10-CM

## 2022-03-15 LAB — TESTOST SERPL-MCNC: 408 NG/DL (ref 249–836)

## 2022-03-15 PROCEDURE — 84403 ASSAY OF TOTAL TESTOSTERONE: CPT

## 2022-03-15 PROCEDURE — 99213 OFFICE O/P EST LOW 20 MIN: CPT | Performed by: INTERNAL MEDICINE

## 2022-03-15 PROCEDURE — 36415 COLL VENOUS BLD VENIPUNCTURE: CPT

## 2022-03-15 NOTE — PROGRESS NOTES
"     Office Note      Date: 03/15/2022  Patient Name: Iam Blanco  MRN: [id number]  : 1970    Chief Complaint   Patient presents with   • Hypogonadism       History of Present Illness:   Iam Blanco is a 51 y.o. male who presents for Hypogonadism  back in January I started him on TRT. Last shot was about 2 weeks ago .  Nothing has gotten worse. No trouble peeing. A few zits. No unusual mood swings.  Couple things are better- foggy thinking is better.  He is sleeping better. Getting up less at night.      energy level is good at work but he tired by the time he gets home.  Sexual function - no issue.   libido - fair.   A little better since starting the shot... not as much as he had hoped.     Subjective          Review of Systems:   Review of Systems   Constitutional: Negative.    HENT: Negative.        The following portions of the patient's history were reviewed and updated as appropriate: allergies, current medications, past family history, past medical history, past social history, past surgical history and problem list.    Objective     Visit Vitals  /62   Pulse 83   Ht 177.8 cm (70\")   Wt 89.4 kg (197 lb)   SpO2 96%   BMI 28.27 kg/m²       Labs:    CBC w/DIFF  Lab Results   Component Value Date    WBC 9.36 10/06/2021    RBC 5.53 10/06/2021    HGB 16.5 10/06/2021    HCT 48.9 10/06/2021    MCV 88.4 10/06/2021    MCH 29.8 10/06/2021    MCHC 33.7 10/06/2021    RDW 12.6 10/06/2021    RDWSD 41.3 10/06/2021    MPV 14.5 (H) 10/06/2021     10/06/2021    NEUTRORELPCT 50.1 2017    LYMPHORELPCT 38.0 2017    MONORELPCT 8.6 2017    EOSRELPCT 2.7 2017    BASORELPCT 0.4 2017    AUTOIGPER 0.2 2017    NEUTROABS 4.83 2017    LYMPHSABS 3.66 2017    MONOSABS 0.83 2017    EOSABS 0.26 2017    BASOSABS 0.04 2017    AUTOIGNUM 0.02 2017       T4  No results found for: FREET4    TSH  No results found for: TSHBASE     Physical " Exam:  Physical Exam  Constitutional:       Appearance: He is normal weight.   Neurological:      Mental Status: He is alert.   Psychiatric:         Mood and Affect: Mood normal.         Thought Content: Thought content normal.         Judgment: Judgment normal.         Assessment / Plan      Assessment & Plan:  Problem List Items Addressed This Visit        Other    Low testosterone in male - Primary    Overview     Has had 2 low testosterone levels  Started replacement in January 2022           Current Assessment & Plan     Nothing got worse. Some things got better. We are headed in the right direction. I suspect we need to adjust the dose           Relevant Medications    Testosterone Enanthate 200 MG/ML solution           Kenny Ryan MD   03/15/2022

## 2022-03-15 NOTE — ASSESSMENT & PLAN NOTE
Nothing got worse. Some things got better. We are headed in the right direction. I suspect we need to adjust the dose

## 2022-04-16 DIAGNOSIS — R79.89 LOW TESTOSTERONE IN MALE: ICD-10-CM

## 2022-04-18 RX ORDER — TESTOSTERONE ENANTHATE 200 MG/ML
1 INJECTION, SOLUTION INTRAMUSCULAR
Qty: 5 ML | Refills: 1 | Status: SHIPPED | OUTPATIENT
Start: 2022-04-18 | End: 2022-09-16 | Stop reason: SDUPTHER

## 2022-06-13 RX ORDER — LEVOCETIRIZINE DIHYDROCHLORIDE 5 MG/1
TABLET, FILM COATED ORAL
Qty: 90 TABLET | Refills: 0 | Status: SHIPPED | OUTPATIENT
Start: 2022-06-13 | End: 2022-11-11

## 2022-07-17 DIAGNOSIS — I10 ESSENTIAL HYPERTENSION: ICD-10-CM

## 2022-07-18 RX ORDER — LOSARTAN POTASSIUM 50 MG/1
TABLET ORAL
Qty: 90 TABLET | Refills: 2 | Status: SHIPPED | OUTPATIENT
Start: 2022-07-18 | End: 2023-03-10

## 2022-08-11 DIAGNOSIS — J01.00 ACUTE NON-RECURRENT MAXILLARY SINUSITIS: ICD-10-CM

## 2022-08-12 RX ORDER — FLUTICASONE PROPIONATE 50 MCG
SPRAY, SUSPENSION (ML) NASAL
Qty: 16 ML | Refills: 2 | Status: SHIPPED | OUTPATIENT
Start: 2022-08-12 | End: 2022-11-13

## 2022-08-12 NOTE — TELEPHONE ENCOUNTER
Rx Refill Note  Requested Prescriptions     Pending Prescriptions Disp Refills   • fluticasone (FLONASE) 50 MCG/ACT nasal spray [Pharmacy Med Name: FLUTICASONE PROP 50 MCG SPRAY] 16 mL 5     Sig: INSTILL 2 SPRAYS INTO THE NOSTRILS AS DIRECTED BY PROVIDER DAILY      Last office visit with prescribing clinician: 1/27/2022      Next office visit with prescribing clinician: Visit date not found            Esau Arreola MA  08/12/22, 07:34 EDT

## 2022-09-16 ENCOUNTER — OFFICE VISIT (OUTPATIENT)
Dept: ENDOCRINOLOGY | Facility: CLINIC | Age: 52
End: 2022-09-16

## 2022-09-16 VITALS
SYSTOLIC BLOOD PRESSURE: 112 MMHG | HEART RATE: 78 BPM | BODY MASS INDEX: 27.92 KG/M2 | OXYGEN SATURATION: 95 % | HEIGHT: 70 IN | DIASTOLIC BLOOD PRESSURE: 70 MMHG | WEIGHT: 195 LBS

## 2022-09-16 DIAGNOSIS — R79.89 LOW TESTOSTERONE IN MALE: Primary | ICD-10-CM

## 2022-09-16 PROCEDURE — 99213 OFFICE O/P EST LOW 20 MIN: CPT | Performed by: INTERNAL MEDICINE

## 2022-09-16 RX ORDER — TESTOSTERONE ENANTHATE 200 MG/ML
0.5 INJECTION, SOLUTION INTRAMUSCULAR WEEKLY
Qty: 5 ML | Refills: 1 | Status: SHIPPED | OUTPATIENT
Start: 2022-09-16 | End: 2022-09-21

## 2022-09-16 NOTE — PROGRESS NOTES
"     Office Note      Date: 2022  Patient Name: Iam Blanco  MRN: [id number]  : 1970    CC: follow up for low testosterone   History of Present Illness:   Iam Blanco is a 52 y.o. male who presents for low Testosterone.  He is on shots every 2 weeks.  He is bothered by worsening acne.   it is mostly on his back and arms.   he is treating it with topical benzoyl peroxide.    Subjective          Review of Systems:   Review of Systems   Genitourinary: Negative for difficulty urinating.   Skin:        acne       The following portions of the patient's history were reviewed and updated as appropriate: allergies, current medications, past family history, past medical history, past social history, past surgical history and problem list.    Objective     Visit Vitals  /70   Pulse 78   Ht 177.8 cm (70\")   Wt 88.5 kg (195 lb)   SpO2 95%   BMI 27.98 kg/m²       Labs:    CBC w/DIFF  Lab Results   Component Value Date    WBC 9.36 10/06/2021    RBC 5.53 10/06/2021    HGB 16.5 10/06/2021    HCT 48.9 10/06/2021    MCV 88.4 10/06/2021    MCH 29.8 10/06/2021    MCHC 33.7 10/06/2021    RDW 12.6 10/06/2021    RDWSD 41.3 10/06/2021    MPV 14.5 (H) 10/06/2021     10/06/2021    NEUTRORELPCT 50.1 2017    LYMPHORELPCT 38.0 2017    MONORELPCT 8.6 2017    EOSRELPCT 2.7 2017    BASORELPCT 0.4 2017    AUTOIGPER 0.2 2017    NEUTROABS 4.83 2017    LYMPHSABS 3.66 2017    MONOSABS 0.83 2017    EOSABS 0.26 2017    BASOSABS 0.04 2017    AUTOIGNUM 0.02 2017       T4  No results found for: FREET4    TSH  No results found for: TSHBASE     Physical Exam:  Physical Exam  Vitals reviewed.   Constitutional:       Appearance: Normal appearance.   Skin:     Comments: acne   Neurological:      Mental Status: He is alert.   Psychiatric:         Mood and Affect: Mood normal.         Thought Content: Thought content normal.         Judgment: Judgment " normal.         Assessment / Plan      Assessment & Plan:  Problem List Items Addressed This Visit        Other    Low testosterone in male - Primary    Overview     Has had 2 low testosterone levels  Started replacement in January 2022         Current Assessment & Plan     Stable but having side effects due to the high spikes after the IM injections.  SQ injections and oral meds not covered by insurance.  Will change  To weekly dose at lower dose          Relevant Medications    Testosterone Enanthate 200 MG/ML solution           Kenny Ryan MD   09/16/2022

## 2022-09-21 ENCOUNTER — TELEPHONE (OUTPATIENT)
Dept: ENDOCRINOLOGY | Facility: CLINIC | Age: 52
End: 2022-09-21

## 2022-09-21 DIAGNOSIS — R79.89 LOW TESTOSTERONE IN MALE: Primary | ICD-10-CM

## 2022-09-21 RX ORDER — TESTOSTERONE CYPIONATE 200 MG/ML
0.5 VIAL (ML) INTRAMUSCULAR WEEKLY
Qty: 5 ML | Refills: 3 | Status: SHIPPED | OUTPATIENT
Start: 2022-09-21 | End: 2023-02-17

## 2022-10-28 ENCOUNTER — OFFICE VISIT (OUTPATIENT)
Dept: FAMILY MEDICINE CLINIC | Facility: CLINIC | Age: 52
End: 2022-10-28

## 2022-10-28 VITALS
TEMPERATURE: 98 F | BODY MASS INDEX: 28.77 KG/M2 | WEIGHT: 201 LBS | DIASTOLIC BLOOD PRESSURE: 72 MMHG | HEART RATE: 82 BPM | OXYGEN SATURATION: 97 % | SYSTOLIC BLOOD PRESSURE: 128 MMHG | RESPIRATION RATE: 21 BRPM | HEIGHT: 70 IN

## 2022-10-28 DIAGNOSIS — J06.9 VIRAL UPPER RESPIRATORY INFECTION: Primary | ICD-10-CM

## 2022-10-28 DIAGNOSIS — Z12.12 ENCOUNTER FOR COLORECTAL CANCER SCREENING: ICD-10-CM

## 2022-10-28 DIAGNOSIS — Z12.11 ENCOUNTER FOR COLORECTAL CANCER SCREENING: ICD-10-CM

## 2022-10-28 PROCEDURE — U0004 COV-19 TEST NON-CDC HGH THRU: HCPCS | Performed by: STUDENT IN AN ORGANIZED HEALTH CARE EDUCATION/TRAINING PROGRAM

## 2022-10-28 PROCEDURE — 99214 OFFICE O/P EST MOD 30 MIN: CPT | Performed by: STUDENT IN AN ORGANIZED HEALTH CARE EDUCATION/TRAINING PROGRAM

## 2022-10-28 RX ORDER — GUAIFENESIN AND DEXTROMETHORPHAN HYDROBROMIDE 600; 30 MG/1; MG/1
2 TABLET, EXTENDED RELEASE ORAL 2 TIMES DAILY PRN
Qty: 20 EACH | Refills: 0 | Status: SHIPPED | OUTPATIENT
Start: 2022-10-28 | End: 2022-11-02

## 2022-10-28 RX ORDER — TESTOSTERONE CYPIONATE 200 MG/ML
INJECTION, SOLUTION INTRAMUSCULAR
COMMUNITY
Start: 2022-10-22 | End: 2023-02-17

## 2022-10-28 RX ORDER — IPRATROPIUM BROMIDE 42 UG/1
2 SPRAY, METERED NASAL 4 TIMES DAILY
Qty: 15 ML | Refills: 1 | Status: SHIPPED | OUTPATIENT
Start: 2022-10-28

## 2022-10-28 NOTE — PATIENT INSTRUCTIONS
- Nasal saline rinses  - mucines  - continue flonse, stop levocetirizine for now   - tylenol and ibuprofen for pain and fevers  - salt water rinses/gargling and lozenges

## 2022-10-28 NOTE — ASSESSMENT & PLAN NOTE
- Patient with symptoms for about a week  - Flu test negative in clinic today, COVID test pending-recommend isolation until results  - Recommend starting Mucinex DM twice daily for decongestant and cough suppression  - Recommend nasal saline rinses, can continue Flonase  - If no improvement in symptoms, patient can start Atrovent intranasally for his symptoms, prescription already sent  - Can continue ibuprofen and Tylenol for pain control  - Can do salt water rinses for throat pain and throat lozenges  - Recommend rest and hydration

## 2022-10-28 NOTE — PROGRESS NOTES
"    Office Note     Name: Iam Blanco    : 1970     MRN: [id number]     Chief Complaint  Cough, Sore Throat, and Earache    Subjective     History of Present Illness:  Iam Blanco is a 52 y.o. male who presents today for cough, sore throat, and earache.  Symptoms have been going on for about a week.  He reports that he started with congestion and then had a sore throat 2 days later.  He has had a productive cough.  Denies any fevers or chills.  Everyone in his family has been sick and have been tested and have been negative for both flu and COVID.  He has been taking ibuprofen for pain control and cough lozenges.  He has been eating and drinking without issue.  He has been taking Flonase and cetirizine.         Objective     Past Medical History:   Diagnosis Date   • Anxiety    • H/O esophageal reflux    • Hypertension    • Influenza    • Testosterone deficiency 2021   • Tobacco abuse    • Toe pain      Past Surgical History:   Procedure Laterality Date   • HERNIA REPAIR       Family History   Problem Relation Age of Onset   • Heart disease Father    • Hypertension Father    • Diabetes Mother    • Diabetes Maternal Grandfather        Vital Signs  /72 (BP Location: Left arm, Patient Position: Sitting, Cuff Size: Adult)   Pulse 82   Temp 98 °F (36.7 °C) (Infrared)   Resp 21   Ht 177.8 cm (70\")   Wt 91.2 kg (201 lb)   SpO2 97%   BMI 28.84 kg/m²   Estimated body mass index is 27.98 kg/m² as calculated from the following:    Height as of 22: 177.8 cm (70\").    Weight as of 22: 88.5 kg (195 lb).    Physical Exam  Vitals reviewed.   Constitutional:       Appearance: Normal appearance.   HENT:      Right Ear: Tympanic membrane normal.      Left Ear: Tympanic membrane normal.      Nose: Nose normal.      Comments: Swollen nasal turbinates     Mouth/Throat:      Mouth: Mucous membranes are moist.   Eyes:      Extraocular Movements: Extraocular movements intact.      " Conjunctiva/sclera: Conjunctivae normal.      Pupils: Pupils are equal, round, and reactive to light.   Cardiovascular:      Rate and Rhythm: Normal rate and regular rhythm.   Pulmonary:      Effort: Pulmonary effort is normal.   Abdominal:      General: Abdomen is flat.      Palpations: Abdomen is soft.   Musculoskeletal:      Comments: Moving all extremities spontaneously   Skin:     General: Skin is warm and dry.   Neurological:      Mental Status: He is alert.      Comments: Alert and oriented   Psychiatric:         Mood and Affect: Mood normal.         Behavior: Behavior normal.                 Assessment and Plan     Diagnoses and all orders for this visit:    1. Viral upper respiratory infection (Primary)  Assessment & Plan:  - Patient with symptoms for about a week  - Flu test negative in clinic today, COVID test pending-recommend isolation until results  - Recommend starting Mucinex DM twice daily for decongestant and cough suppression  - Recommend nasal saline rinses, can continue Flonase  - If no improvement in symptoms, patient can start Atrovent intranasally for his symptoms, prescription already sent  - Can continue ibuprofen and Tylenol for pain control  - Can do salt water rinses for throat pain and throat lozenges  - Recommend rest and hydration    Orders:  -     COVID-19 PCR, [facility], NP SWAB IN [facility]SP[facility] MEDIA/ORAL SWISH 24-30 HR TAT - Swab, Nasopharynx    2. Encounter for colorectal cancer screening  Assessment & Plan:  - Discussed colorectal cancer screening with patient and he elected Cologuard, ordered today    Orders:  -     Cologuard - Stool, Per Rectum; Future    Other orders  -     guaifenesin-dextromethorphan (MUCINEX DM)  MG tablet sustained-release 12 hour tablet; Take 2 tablets by mouth 2 (Two) Times a Day As Needed (cough and congestion) for up to 5 days.  Dispense: 20 each; Refill: 0  -     ipratropium (ATROVENT) 0.06 % nasal spray; 2 sprays into the nostril(s)  as directed by provider 4 (Four) Times a Day.  Dispense: 15 mL; Refill: 1      Follow Up  Return in about 2 weeks (around 11/11/2022) for Annual.    Julianna Muñoz MD

## 2022-10-29 LAB — SARS-COV-2 RNA NOSE QL NAA+PROBE: NOT DETECTED

## 2022-11-07 RX ORDER — TESTOSTERONE ENANTHATE 200 MG/ML
200 INJECTION, SOLUTION INTRAMUSCULAR
Status: DISCONTINUED | OUTPATIENT
Start: 2022-11-07 | End: 2023-02-17

## 2022-11-11 ENCOUNTER — OFFICE VISIT (OUTPATIENT)
Dept: FAMILY MEDICINE CLINIC | Facility: CLINIC | Age: 52
End: 2022-11-11

## 2022-11-11 ENCOUNTER — LAB (OUTPATIENT)
Dept: LAB | Facility: HOSPITAL | Age: 52
End: 2022-11-11

## 2022-11-11 VITALS
RESPIRATION RATE: 21 BRPM | HEART RATE: 75 BPM | SYSTOLIC BLOOD PRESSURE: 116 MMHG | DIASTOLIC BLOOD PRESSURE: 72 MMHG | BODY MASS INDEX: 28.83 KG/M2 | TEMPERATURE: 98.2 F | HEIGHT: 70 IN | WEIGHT: 201.4 LBS | OXYGEN SATURATION: 98 %

## 2022-11-11 DIAGNOSIS — Z00.00 ANNUAL PHYSICAL EXAM: ICD-10-CM

## 2022-11-11 DIAGNOSIS — I10 ESSENTIAL HYPERTENSION: ICD-10-CM

## 2022-11-11 DIAGNOSIS — L70.9 ACNE, UNSPECIFIED ACNE TYPE: ICD-10-CM

## 2022-11-11 DIAGNOSIS — R79.89 LOW TESTOSTERONE: ICD-10-CM

## 2022-11-11 DIAGNOSIS — I10 HYPERTENSION, UNSPECIFIED TYPE: ICD-10-CM

## 2022-11-11 DIAGNOSIS — Z00.00 ANNUAL PHYSICAL EXAM: Primary | ICD-10-CM

## 2022-11-11 DIAGNOSIS — M67.40 MUCOID CYST OF JOINT: ICD-10-CM

## 2022-11-11 PROBLEM — J06.9 VIRAL UPPER RESPIRATORY INFECTION: Status: RESOLVED | Noted: 2022-10-28 | Resolved: 2022-11-11

## 2022-11-11 LAB
ALBUMIN SERPL-MCNC: 4.8 G/DL (ref 3.5–5.2)
ALBUMIN/GLOB SERPL: 2.2 G/DL
ALP SERPL-CCNC: 56 U/L (ref 39–117)
ALT SERPL W P-5'-P-CCNC: 34 U/L (ref 1–41)
ANION GAP SERPL CALCULATED.3IONS-SCNC: 11.8 MMOL/L (ref 5–15)
AST SERPL-CCNC: 41 U/L (ref 1–40)
BILIRUB SERPL-MCNC: 0.9 MG/DL (ref 0–1.2)
BUN SERPL-MCNC: 18 MG/DL (ref 6–20)
BUN/CREAT SERPL: 14 (ref 7–25)
CALCIUM SPEC-SCNC: 9.8 MG/DL (ref 8.6–10.5)
CHLORIDE SERPL-SCNC: 102 MMOL/L (ref 98–107)
CHOLEST SERPL-MCNC: 166 MG/DL (ref 0–200)
CO2 SERPL-SCNC: 26.2 MMOL/L (ref 22–29)
CREAT SERPL-MCNC: 1.29 MG/DL (ref 0.76–1.27)
EGFRCR SERPLBLD CKD-EPI 2021: 67.5 ML/MIN/1.73
GLOBULIN UR ELPH-MCNC: 2.2 GM/DL
GLUCOSE SERPL-MCNC: 105 MG/DL (ref 65–99)
HBA1C MFR BLD: 5.7 % (ref 4.8–5.6)
HCV AB SER DONR QL: NORMAL
HDLC SERPL-MCNC: 43 MG/DL (ref 40–60)
LDLC SERPL CALC-MCNC: 114 MG/DL (ref 0–100)
LDLC/HDLC SERPL: 2.66 {RATIO}
POTASSIUM SERPL-SCNC: 5.2 MMOL/L (ref 3.5–5.2)
PROT SERPL-MCNC: 7 G/DL (ref 6–8.5)
SODIUM SERPL-SCNC: 140 MMOL/L (ref 136–145)
TRIGL SERPL-MCNC: 44 MG/DL (ref 0–150)
VLDLC SERPL-MCNC: 9 MG/DL (ref 5–40)

## 2022-11-11 PROCEDURE — 80053 COMPREHEN METABOLIC PANEL: CPT

## 2022-11-11 PROCEDURE — 84402 ASSAY OF FREE TESTOSTERONE: CPT

## 2022-11-11 PROCEDURE — 99396 PREV VISIT EST AGE 40-64: CPT | Performed by: STUDENT IN AN ORGANIZED HEALTH CARE EDUCATION/TRAINING PROGRAM

## 2022-11-11 PROCEDURE — 86803 HEPATITIS C AB TEST: CPT

## 2022-11-11 PROCEDURE — 36415 COLL VENOUS BLD VENIPUNCTURE: CPT

## 2022-11-11 PROCEDURE — 84403 ASSAY OF TOTAL TESTOSTERONE: CPT

## 2022-11-11 PROCEDURE — 80061 LIPID PANEL: CPT

## 2022-11-11 PROCEDURE — 99214 OFFICE O/P EST MOD 30 MIN: CPT | Performed by: STUDENT IN AN ORGANIZED HEALTH CARE EDUCATION/TRAINING PROGRAM

## 2022-11-11 PROCEDURE — 90715 TDAP VACCINE 7 YRS/> IM: CPT | Performed by: STUDENT IN AN ORGANIZED HEALTH CARE EDUCATION/TRAINING PROGRAM

## 2022-11-11 PROCEDURE — 83036 HEMOGLOBIN GLYCOSYLATED A1C: CPT

## 2022-11-11 PROCEDURE — 90471 IMMUNIZATION ADMIN: CPT | Performed by: STUDENT IN AN ORGANIZED HEALTH CARE EDUCATION/TRAINING PROGRAM

## 2022-11-11 RX ORDER — LEVOCETIRIZINE DIHYDROCHLORIDE 5 MG/1
TABLET, FILM COATED ORAL
Qty: 90 TABLET | Refills: 0 | Status: SHIPPED | OUTPATIENT
Start: 2022-11-11 | End: 2022-11-28

## 2022-11-11 RX ORDER — ADAPALENE 0.1 G/100G
1 CREAM TOPICAL NIGHTLY
Qty: 45 G | Refills: 5 | Status: SHIPPED | OUTPATIENT
Start: 2022-11-11 | End: 2022-11-25 | Stop reason: SDUPTHER

## 2022-11-11 NOTE — ASSESSMENT & PLAN NOTE
- Patient with a mucoid cyst on right thumb, can be bothersome at times  - Offered orthopedic hand surgery referral for removal, but patient reports that he will wait for now and continue to monitor

## 2022-11-11 NOTE — ASSESSMENT & PLAN NOTE
- On arms, shoulders, and back since starting testosterone injections  - We will start benzyl peroxide washes and adapalene, discussed that he needs to consistently do this for 3 to 6 months and if no improvement can consider topical clindamycin

## 2022-11-11 NOTE — PATIENT INSTRUCTIONS

## 2022-11-11 NOTE — ASSESSMENT & PLAN NOTE
- Discussed diet and exercise: At least 30 minutes of exercise 5 times per week and eating a variety of fresh fruits, vegetables, and lean proteins and limiting carbohydrate intake and fatty food intake  - Additional information given in patient instructions at checkout  - Counseled on lung cancer screening: Patient with a 24-pack-year history, quit in the last 8 years and does qualify for lung cancer screening but he declines at this time  - Declines shingles, COVID booster, and influenza vaccines today-not interested in influenza vaccine but would like to get shingles and COVID booster in the future  - Tdap administered during clinic visit today  - Ordered Cologuard at last appointment patient reports that he will get that done over the weekend  - Basic labs today

## 2022-11-11 NOTE — ASSESSMENT & PLAN NOTE
- Testosterone levels are managed by patient's endocrinologist, did not have labs at that appointment and feels that he does need a higher dose  - I will get a testosterone level on patient today per his request, but did discuss that I will not be managing the testosterone given that his endocrinologist take care of that, patient is agreeable  - Continue testosterone per endocrinology

## 2022-11-11 NOTE — PROGRESS NOTES
Office Note     Name: Iam Blanco    : 1970     MRN: [id number]     Chief Complaint  Annual Exam (Physical Exam )    Subjective     History of Present Illness:  Iam Blanco is a 52 y.o. male who presents today for a physical.     Right thumb: Patient has had a lump on his right thumb for approximately 2 years.  It is right at the joint line and can sometimes be bothersome.  He is currently not interested in Ortho referral.      Acne: Patient reports that he started testosterone injections earlier this year.  Approximately 3 months later, he started developing acne on his arms, shoulders and back.  He has been using benzyl peroxide wash with very little relief of his symptoms.  His endocrinologist even split his testosterone dosing but that has not helped.     Low testosterone: Patient gets weekly testosterone injections through his endocrinologist.  He would like his testosterone level checked today.    HTN: On losartan 50mg daily, tolerating his medication well. No chest pain, dizzines, headache or blurry bision.     Allergic rhinitis: Currently controlled.  Takes Flonase and Xyzal as needed.    SH:  - Denies current smoking or drinking, quit 8 years ago  - Denies any recreational drug use  - Works for the [employer]  - Does workout several times per week and eats a fairly healthy diet    HM:   - Cologuard was ordered at his last visit and he will do that over the weekend  - Declines shingles, COVID booster, and influenza vaccines today-not interested in influenza vaccine but would like to get shingles and COVID booster in the future  - Okay with Tdap today  - Declines lung cancer screening  - Okay with basic blood work today          Objective     Past Medical History:   Diagnosis Date   • Anxiety    • H/O esophageal reflux    • Hypertension    • Influenza    • Testosterone deficiency 2021   • Tobacco abuse    • Toe pain      Past Surgical History:   Procedure Laterality Date   •  "HERNIA REPAIR       Family History   Problem Relation Age of Onset   • Heart disease Father    • Hypertension Father    • Diabetes Mother    • Diabetes Maternal Grandfather        Vital Signs  /72 (BP Location: Left arm, Patient Position: Sitting, Cuff Size: Adult)   Pulse 75   Temp 98.2 °F (36.8 °C) (Infrared)   Resp 21   Ht 177.8 cm (70\")   Wt 91.4 kg (201 lb 6.4 oz)   SpO2 98%   BMI 28.90 kg/m²   Estimated body mass index is 28.9 kg/m² as calculated from the following:    Height as of this encounter: 177.8 cm (70\").    Weight as of this encounter: 91.4 kg (201 lb 6.4 oz).    Physical Exam  Vitals reviewed.   Constitutional:       Appearance: Normal appearance.   HENT:      Head: Normocephalic.      Right Ear: External ear normal.      Left Ear: External ear normal.      Nose: Nose normal.      Mouth/Throat:      Mouth: Mucous membranes are moist.   Eyes:      Extraocular Movements: Extraocular movements intact.   Cardiovascular:      Rate and Rhythm: Normal rate and regular rhythm.      Heart sounds: Normal heart sounds.   Pulmonary:      Effort: Pulmonary effort is normal. No respiratory distress.      Breath sounds: Normal breath sounds.   Abdominal:      General: Abdomen is flat.      Palpations: Abdomen is soft.   Musculoskeletal:      Cervical back: No rigidity.      Comments: Moving all extremities spontaneously   Skin:     General: Skin is warm and dry.   Neurological:      General: No focal deficit present.      Mental Status: He is alert and oriented to person, place, and time.   Psychiatric:         Mood and Affect: Mood normal.         Behavior: Behavior normal.                 Assessment and Plan     Diagnoses and all orders for this visit:    1. Annual physical exam (Primary)  Assessment & Plan:  - Discussed diet and exercise: At least 30 minutes of exercise 5 times per week and eating a variety of fresh fruits, vegetables, and lean proteins and limiting carbohydrate intake and fatty " food intake  - Additional information given in patient instructions at checkout  - Counseled on lung cancer screening: Patient with a 24-pack-year history, quit in the last 8 years and does qualify for lung cancer screening but he declines at this time  - Declines shingles, COVID booster, and influenza vaccines today-not interested in influenza vaccine but would like to get shingles and COVID booster in the future  - Tdap administered during clinic visit today  - Ordered Cologuard at last appointment patient reports that he will get that done over the weekend  - Basic labs today    Orders:  -     Lipid Panel; Future  -     Hemoglobin A1c; Future  -     Hepatitis C Antibody; Future  -     Tdap Vaccine Greater Than or Equal To 6yo IM    2. Hypertension, unspecified type  Assessment & Plan:  - Controlled  - Continue losartan 50 mg daily  - Obtaining CMP today and lipid panel    Orders:  -     Comprehensive Metabolic Panel; Future    3. Low testosterone  Assessment & Plan:  - Testosterone levels are managed by patient's endocrinologist, did not have labs at that appointment and feels that he does need a higher dose  - I will get a testosterone level on patient today per his request, but did discuss that I will not be managing the testosterone given that his endocrinologist take care of that, patient is agreeable  - Continue testosterone per endocrinology    Orders:  -     Testosterone, Free, Total; Future    4. Acne, unspecified acne type  Assessment & Plan:  - On arms, shoulders, and back since starting testosterone injections  - We will start benzyl peroxide washes and adapalene, discussed that he needs to consistently do this for 3 to 6 months and if no improvement can consider topical clindamycin    Orders:  -     adapalene (Differin) 0.1 % cream; Apply 1 application topically to the appropriate area as directed Every Night.  Dispense: 45 g; Refill: 5  -     benzoyl peroxide 5 % external liquid; Apply  topically to  the appropriate area as directed 2 (Two) Times a Day.  Dispense: 142 g; Refill: 6    5. Mucoid cyst of joint  Assessment & Plan:  - Patient with a mucoid cyst on right thumb, can be bothersome at times  - Offered orthopedic hand surgery referral for removal, but patient reports that he will wait for now and continue to monitor      6. Essential hypertension  Assessment & Plan:  - Controlled  - Continue losartan 50 mg daily  - Obtaining CMP today and lipid panel      BMI is >= 25 and <30. (Overweight) The following options were offered after discussion;: exercise counseling/recommendations and nutrition counseling/recommendations        Follow Up  Return in about 6 months (around 5/11/2023) for HTN and acne.    Julianna Muñoz MD

## 2022-11-12 DIAGNOSIS — J01.00 ACUTE NON-RECURRENT MAXILLARY SINUSITIS: ICD-10-CM

## 2022-11-12 NOTE — TELEPHONE ENCOUNTER
Rx Refill Note  Requested Prescriptions     Pending Prescriptions Disp Refills   • fluticasone (FLONASE) 50 MCG/ACT nasal spray [Pharmacy Med Name: FLUTICASONE PROP 50 MCG SPRAY] 16 mL 2     Sig: INSTILL 2 SPRAYS INTO THE NOSTRILS AS DIRECTED BY PROVIDER DAILY      Last office visit with prescribing clinician: 1/27/2022      Next office visit with prescribing clinician: 5/11/2023            Esau Arreola MA  11/12/22, 12:46 EST

## 2022-11-13 RX ORDER — FLUTICASONE PROPIONATE 50 MCG
SPRAY, SUSPENSION (ML) NASAL
Qty: 16 ML | Refills: 2 | Status: SHIPPED | OUTPATIENT
Start: 2022-11-13 | End: 2023-02-06

## 2022-11-17 LAB
TESTOST FREE SERPL-MCNC: 24.1 PG/ML (ref 7.2–24)
TESTOST SERPL-MCNC: 906 NG/DL (ref 264–916)

## 2022-11-25 DIAGNOSIS — L70.9 ACNE, UNSPECIFIED ACNE TYPE: ICD-10-CM

## 2022-11-28 RX ORDER — ADAPALENE 0.1 G/100G
1 CREAM TOPICAL NIGHTLY
Qty: 90 G | Refills: 5 | Status: SHIPPED | OUTPATIENT
Start: 2022-11-28

## 2022-11-28 RX ORDER — LEVOCETIRIZINE DIHYDROCHLORIDE 5 MG/1
TABLET, FILM COATED ORAL
Qty: 90 TABLET | Refills: 0 | Status: SHIPPED | OUTPATIENT
Start: 2022-11-28 | End: 2023-03-10

## 2022-11-28 NOTE — TELEPHONE ENCOUNTER
Rx Refill Note  Requested Prescriptions     Pending Prescriptions Disp Refills   • adapalene (Differin) 0.1 % cream 45 g 5     Sig: Apply 1 application topically to the appropriate area as directed Every Night.      Last office visit with prescribing clinician: 11/11/2022      Next office visit with prescribing clinician: Visit date not found            Tameka Rosado MA  11/28/22, 08:21 EST

## 2023-02-06 DIAGNOSIS — J01.00 ACUTE NON-RECURRENT MAXILLARY SINUSITIS: ICD-10-CM

## 2023-02-06 RX ORDER — FLUTICASONE PROPIONATE 50 MCG
SPRAY, SUSPENSION (ML) NASAL
Qty: 16 ML | Refills: 2 | Status: SHIPPED | OUTPATIENT
Start: 2023-02-06

## 2023-02-06 NOTE — TELEPHONE ENCOUNTER
Rx Refill Note  Requested Prescriptions     Pending Prescriptions Disp Refills   • fluticasone (FLONASE) 50 MCG/ACT nasal spray [Pharmacy Med Name: FLUTICASONE PROP 50 MCG SPRAY] 16 mL 2     Sig: INSTILL 2 SPRAYS INTO THE NOSTRILS AS DIRECTED BY PROVIDER DAILY      Last office visit with prescribing clinician: 1/27/2022   Last telemedicine visit with prescribing clinician: 5/11/2023   Next office visit with prescribing clinician: 5/11/2023                         Would you like a call back once the refill request has been completed: [] Yes [] No    If the office needs to give you a call back, can they leave a voicemail: [] Yes [] No    Elba Morejon MA  02/06/23, 08:04 EST

## 2023-02-16 DIAGNOSIS — R79.89 LOW TESTOSTERONE IN MALE: ICD-10-CM

## 2023-02-17 RX ORDER — TESTOSTERONE CYPIONATE 200 MG/ML
INJECTION, SOLUTION INTRAMUSCULAR
Qty: 4 ML | Refills: 4 | Status: SHIPPED | OUTPATIENT
Start: 2023-02-17

## 2023-03-10 DIAGNOSIS — I10 ESSENTIAL HYPERTENSION: ICD-10-CM

## 2023-03-10 RX ORDER — LOSARTAN POTASSIUM 50 MG/1
TABLET ORAL
Qty: 90 TABLET | Refills: 2 | Status: SHIPPED | OUTPATIENT
Start: 2023-03-10

## 2023-03-10 RX ORDER — LEVOCETIRIZINE DIHYDROCHLORIDE 5 MG/1
TABLET, FILM COATED ORAL
Qty: 90 TABLET | Refills: 0 | Status: SHIPPED | OUTPATIENT
Start: 2023-03-10

## 2023-03-22 ENCOUNTER — OFFICE VISIT (OUTPATIENT)
Dept: ENDOCRINOLOGY | Facility: CLINIC | Age: 53
End: 2023-03-22
Payer: COMMERCIAL

## 2023-03-22 VITALS
OXYGEN SATURATION: 95 % | HEIGHT: 70 IN | BODY MASS INDEX: 29.2 KG/M2 | WEIGHT: 204 LBS | SYSTOLIC BLOOD PRESSURE: 120 MMHG | DIASTOLIC BLOOD PRESSURE: 73 MMHG | HEART RATE: 84 BPM

## 2023-03-22 DIAGNOSIS — R79.89 LOW TESTOSTERONE IN MALE: Primary | ICD-10-CM

## 2023-03-22 PROCEDURE — 99213 OFFICE O/P EST LOW 20 MIN: CPT | Performed by: INTERNAL MEDICINE

## 2023-03-22 RX ORDER — MESALAMINE 1.2 G/1
2 TABLET, DELAYED RELEASE ORAL DAILY
COMMUNITY
Start: 2023-03-10

## 2023-03-22 NOTE — PROGRESS NOTES
"     Office Note      Date: 2023  Patient Name: Iam Blanco  MRN: [id number]  : 1970    Chief Complaint   Patient presents with   • Hypogonadism       History of Present Illness:   Iam Blanco is a 52 y.o. male who presents for Hypogonadism  .   Current rx: 100 mg weekly     Changes in history: diagnosed withUC.. is on lialda for that.  Questions /problems: none. Feels the  Testosterone  Is working  Pretty well   He had been hoping he would have more energy so is disappointed in that aspect of his treatment     Acne is better  No trouble peeing  No bp issue  No E.D.     Testosterone 4 months ago was good     Subjective          Review of Systems:   Review of Systems   Constitutional: Positive for fatigue.   HENT: Negative.    Respiratory: Negative.        The following portions of the patient's history were reviewed and updated as appropriate: allergies, current medications, past family history, past medical history, past social history, past surgical history and problem list.    Objective     Visit Vitals  /73   Pulse 84   Ht 177.8 cm (70\")   Wt 92.5 kg (204 lb)   SpO2 95%   BMI 29.27 kg/m²           Physical Exam:  Physical Exam  Vitals reviewed.   Constitutional:       Appearance: Normal appearance.   Neurological:      Mental Status: He is alert.   Psychiatric:         Mood and Affect: Mood normal.         Thought Content: Thought content normal.         Judgment: Judgment normal.         Assessment / Plan      Assessment & Plan:  Problem List Items Addressed This Visit        Other    Low testosterone in male - Primary    Overview     Has had 2 low testosterone levels  Started replacement in 2022         Current Assessment & Plan     Levels are at goal. He is on the right dose. No changes are needed . Recheck 6 months          Relevant Medications    Testosterone Cypionate (DEPOTESTOTERONE CYPIONATE) 200 MG/ML injection        Kenny Ryan MD   2023  "

## 2023-05-04 DIAGNOSIS — I10 ESSENTIAL HYPERTENSION: ICD-10-CM

## 2023-05-04 RX ORDER — LOSARTAN POTASSIUM 50 MG/1
50 TABLET ORAL DAILY
Qty: 90 TABLET | Refills: 2 | Status: SHIPPED | OUTPATIENT
Start: 2023-05-04

## 2023-05-12 DIAGNOSIS — J01.00 ACUTE NON-RECURRENT MAXILLARY SINUSITIS: ICD-10-CM

## 2023-05-12 RX ORDER — FLUTICASONE PROPIONATE 50 MCG
SPRAY, SUSPENSION (ML) NASAL
Qty: 16 ML | Refills: 2 | Status: SHIPPED | OUTPATIENT
Start: 2023-05-12

## 2023-06-08 DIAGNOSIS — L70.9 ACNE, UNSPECIFIED ACNE TYPE: ICD-10-CM

## 2023-06-08 RX ORDER — ADAPALENE 0.1 G/100G
1 CREAM TOPICAL NIGHTLY
Qty: 90 G | Refills: 0 | Status: SHIPPED | OUTPATIENT
Start: 2023-06-08

## 2023-06-08 NOTE — TELEPHONE ENCOUNTER
Rx Refill Note  Requested Prescriptions     Pending Prescriptions Disp Refills    adapalene (DIFFERIN) 0.1 % cream [Pharmacy Med Name: ADAPALENE 0.1% CREAM] 90 g 5     Sig: APPLY 1 APPLICATION TOPICALLY TO THE APPROPRIATE AREA AS DIRECTED EVERY NIGHT.      Last office visit with prescribing clinician: 11/11/2022   Last telemedicine visit with prescribing clinician: Visit date not found   Next office visit with prescribing clinician: Visit date not found                         Would you like a call back once the refill request has been completed: [] Yes [] No    If the office needs to give you a call back, can they leave a voicemail: [] Yes [] No    Tameka Rosado MA  06/08/23, 10:31 EDT

## 2023-08-16 ENCOUNTER — OFFICE VISIT (OUTPATIENT)
Dept: FAMILY MEDICINE CLINIC | Facility: CLINIC | Age: 53
End: 2023-08-16
Payer: COMMERCIAL

## 2023-08-16 VITALS
WEIGHT: 193.6 LBS | SYSTOLIC BLOOD PRESSURE: 104 MMHG | HEIGHT: 70 IN | TEMPERATURE: 98.6 F | BODY MASS INDEX: 27.72 KG/M2 | OXYGEN SATURATION: 99 % | HEART RATE: 91 BPM | DIASTOLIC BLOOD PRESSURE: 66 MMHG

## 2023-08-16 DIAGNOSIS — U07.1 COVID-19 VIRUS INFECTION: Primary | ICD-10-CM

## 2023-08-16 PROBLEM — K51.90 ULCERATIVE COLITIS IN REMISSION: Status: ACTIVE | Noted: 2023-08-16

## 2023-08-16 LAB
EXPIRATION DATE: ABNORMAL
EXPIRATION DATE: NORMAL
FLUAV AG NPH QL: NEGATIVE
FLUBV AG NPH QL: NEGATIVE
INTERNAL CONTROL: ABNORMAL
INTERNAL CONTROL: NORMAL
Lab: ABNORMAL
Lab: NORMAL
SARS-COV-2 AG UPPER RESP QL IA.RAPID: DETECTED

## 2023-08-16 NOTE — ASSESSMENT & PLAN NOTE
- Point-of-care COVID-positive, point-of-care flu negative  - Patient is on day 3 of symptoms  - Although patient has a history of ulcerative colitis, he is currently not on any immunosuppressants and does not have any other risk factors that would warrant Paxlovid administration  - Advised symptomatic management, advised patient to avoid NSAIDs given history of ulcerative colitis  - Can use Tylenol for pain control and fevers  - I did offer prescribing some cough medicine, but patient said that he will use over-the-counter medications  - Return to clinic if no improvement or worsening of symptoms

## 2023-08-16 NOTE — PROGRESS NOTES
"    Office Note     Name: Iam Blanco    : 1970     MRN: [id number]     Chief Complaint  Cough (Runny nose and eyes, fever last few days, mother tested positive for covid today.)    Subjective     History of Present Illness:  Iam Blanco is a 53 y.o. male who presents today for cough, fever, runny nose and general feeling of being unwell.  Patient reports that his symptoms started on Monday after trip.  His mother tested positive for COVID.  Patient reports that he has been taking Benadryl because he thought it was secondary to allergies.  He also been taking ibuprofen as needed.  Patient reports that he has had some shortness of breath going up the stairs.  Since he last met me, he did have a diagnosis of ulcerative colitis and is currently on mesalamine.  He denies any symptoms.            Objective     Past Medical History:   Diagnosis Date    Anxiety     H/O esophageal reflux     Hypertension     Influenza     Testosterone deficiency 2021    Tobacco abuse     Toe pain      Past Surgical History:   Procedure Laterality Date    HERNIA REPAIR       Family History   Problem Relation Age of Onset    Heart disease Father     Hypertension Father     Diabetes Mother     Diabetes Maternal Grandfather        Vital Signs  /66   Pulse 91   Temp 98.6 øF (37 øC) (Infrared)   Ht 177.8 cm (70\")   Wt 87.8 kg (193 lb 9.6 oz)   BMI 27.78 kg/mý   Estimated body mass index is 27.78 kg/mý as calculated from the following:    Height as of this encounter: 177.8 cm (70\").    Weight as of this encounter: 87.8 kg (193 lb 9.6 oz).    Physical Exam  Vitals reviewed.   Constitutional:       Appearance: Normal appearance.   HENT:      Head: Normocephalic and atraumatic.      Right Ear: Tympanic membrane normal.      Left Ear: Tympanic membrane normal.      Nose: Nose normal.      Mouth/Throat:      Mouth: Mucous membranes are moist.   Eyes:      Conjunctiva/sclera: Conjunctivae normal.   Cardiovascular: "      Rate and Rhythm: Normal rate and regular rhythm.   Pulmonary:      Effort: Pulmonary effort is normal.      Breath sounds: Normal breath sounds.   Abdominal:      General: Abdomen is flat.      Palpations: Abdomen is soft.   Neurological:      Mental Status: He is alert.             Assessment and Plan     Diagnoses and all orders for this visit:    1. COVID-19 virus infection (Primary)  Assessment & Plan:  - Point-of-care COVID-positive, point-of-care flu negative  - Patient is on day 3 of symptoms  - Although patient has a history of ulcerative colitis, he is currently not on any immunosuppressants and does not have any other risk factors that would warrant Paxlovid administration  - Advised symptomatic management, advised patient to avoid NSAIDs given history of ulcerative colitis  - Can use Tylenol for pain control and fevers  - I did offer prescribing some cough medicine, but patient said that he will use over-the-counter medications  - Return to clinic if no improvement or worsening of symptoms    Orders:  -     POC Influenza A / B  -     POCT VERITOR SARS-CoV-2 Antigen      Follow Up  No follow-ups on file.    Julianna Muñoz MD

## 2023-09-07 RX ORDER — LEVOCETIRIZINE DIHYDROCHLORIDE 5 MG/1
5 TABLET, FILM COATED ORAL EVERY EVENING
Qty: 90 TABLET | Refills: 0 | Status: SHIPPED | OUTPATIENT
Start: 2023-09-07

## 2023-09-26 ENCOUNTER — OFFICE VISIT (OUTPATIENT)
Dept: ENDOCRINOLOGY | Facility: CLINIC | Age: 53
End: 2023-09-26
Payer: COMMERCIAL

## 2023-09-26 VITALS
SYSTOLIC BLOOD PRESSURE: 108 MMHG | HEIGHT: 70 IN | WEIGHT: 194.4 LBS | DIASTOLIC BLOOD PRESSURE: 66 MMHG | BODY MASS INDEX: 27.83 KG/M2 | OXYGEN SATURATION: 95 % | HEART RATE: 66 BPM

## 2023-09-26 DIAGNOSIS — R79.89 LOW TESTOSTERONE IN MALE: Primary | ICD-10-CM

## 2023-09-26 DIAGNOSIS — Z12.5 PROSTATE CANCER SCREENING: ICD-10-CM

## 2023-09-26 LAB
DEPRECATED RDW RBC AUTO: 42.2 FL (ref 37–54)
ERYTHROCYTE [DISTWIDTH] IN BLOOD BY AUTOMATED COUNT: 13.4 % (ref 12.3–15.4)
HCT VFR BLD AUTO: 56.5 % (ref 37.5–51)
HGB BLD-MCNC: 19.4 G/DL (ref 13–17.7)
MCH RBC QN AUTO: 30.3 PG (ref 26.6–33)
MCHC RBC AUTO-ENTMCNC: 34.3 G/DL (ref 31.5–35.7)
MCV RBC AUTO: 88.3 FL (ref 79–97)
PLATELET # BLD AUTO: 215 10*3/MM3 (ref 140–450)
PMV BLD AUTO: 12.4 FL (ref 6–12)
PSA SERPL-MCNC: 0.58 NG/ML (ref 0–4)
RBC # BLD AUTO: 6.4 10*6/MM3 (ref 4.14–5.8)
TESTOST SERPL-MCNC: 499 NG/DL (ref 193–740)
WBC NRBC COR # BLD: 8.71 10*3/MM3 (ref 3.4–10.8)

## 2023-09-26 PROCEDURE — 85027 COMPLETE CBC AUTOMATED: CPT | Performed by: INTERNAL MEDICINE

## 2023-09-26 PROCEDURE — G0103 PSA SCREENING: HCPCS | Performed by: INTERNAL MEDICINE

## 2023-09-26 PROCEDURE — 84403 ASSAY OF TOTAL TESTOSTERONE: CPT | Performed by: INTERNAL MEDICINE

## 2023-09-26 NOTE — PROGRESS NOTES
"     Office Note      Date: 2023  Patient Name: Iam Blanco  MRN: [id number]  : 1970    Chief Complaint   Patient presents with    Low testosterone in male       History of Present Illness:   Iam Blanco is a 53 y.o. male who presents for Low testosterone in male  .   Current rx: testosterone 100 mg per week     Changes in history:none   Questions /problems:none     Subjective          Review of Systems:   Review of Systems   Constitutional:  Positive for fatigue.        Has appointment for a sleep study as he has been excessively snoring    Endocrine: Negative for cold intolerance and heat intolerance.   Genitourinary:  Negative for difficulty urinating.        Libido is reasonable  Sexual function has been good    Skin:         No major issues with acne      The following portions of the patient's history were reviewed and updated as appropriate: allergies, current medications, past family history, past medical history, past social history, past surgical history, and problem list.    Objective     Visit Vitals  /66 (BP Location: Left arm, Patient Position: Sitting, Cuff Size: Adult)   Pulse 66   Ht 177.8 cm (70\")   Wt 88.2 kg (194 lb 6.4 oz)   SpO2 95%   BMI 27.89 kg/m²           Physical Exam:  Physical Exam    Assessment / Plan      Assessment & Plan:  Problem List Items Addressed This Visit          Other    Low testosterone in male - Primary    Overview     Has had 2 low testosterone levels  Started replacement in 2022         Relevant Medications    Testosterone Cypionate (DEPOTESTOTERONE CYPIONATE) 200 MG/ML injection    Other Relevant Orders    Testosterone    CBC (No Diff)    Prostate cancer screening    Relevant Orders    PSA Screen        Kenny Ryan MD   2023  "

## 2023-10-06 RX ORDER — LEVOCETIRIZINE DIHYDROCHLORIDE 5 MG/1
TABLET, FILM COATED ORAL
Qty: 90 TABLET | Refills: 0 | Status: SHIPPED | OUTPATIENT
Start: 2023-10-06

## 2023-10-23 NOTE — PROGRESS NOTES
Chief Complaint  Sleeping Problem    Subjective     History of Present Illness:  Iam Blanco is a 53 y.o. male with a history of hypertension, anxiety, and tobacco use.  The patient is referred by Levar Tanner MD with primary care.  Patient has had witnessed episodes of snoring.  Review of patient's self-reported questionnaire notes symptoms including the aforementioned snoring, witnessed apnea, frequent awakening, nonrestorative sleep, dry mouth, sore throat, nasal allergies, difficulty falling and staying asleep, acting out dreams, and lack of concentration.  Patient typically goes to bed 11 PM-12 AM waking at 6 AM on weekdays and waking at 6-9 AM on weekends.  He estimates an average of 6 hours of sleep per night and it takes approximately 30 minutes for him to get to sleep.  Patient is a former smoker, and denies use of alcohol or illicit/recreational drugs.  He drinks 6 cups of regular coffee daily.  The patient's wife reports witnessed episodes of apnea, and pauses in breathing which occur occasionally.  Additionally the patient is observed with heavy snoring which occurs continuously, nightly, and in all sleeping positions.  Patient also is seen talking in his sleep.    Further details are as follows:    [place] Scale is (out of 24): Total score: 11     Estimated average amount of sleep per night: 6 hours  Number of times he wakes up at night: 1-3 times  Difficulty falling back asleep: occasionally  It usually takes 30 minutes to go to sleep.  He feels sleepy upon waking up: yes  Rotating or night shift work: no    Drowsiness/Sleepiness:  He exhibits the following:  excessive daytime sleepiness  excessive daytime fatigue  falls asleep watching TV  Occasional naps     Snoring/Breathing:  He exhibits the following:  loud snoring, quits breathing at night, awakens with dry mouth, and sore throat when waking up in the morning    Head Injury:  He exhibits the following:  Yes. Type: 2003    Reflux:  He  describes the following:  none    Narcolepsy:  He exhibits the following:  none    RLS/PLMs:  He describes the following:  none    Insomnia:  He describes the following:  frequent awakenings    Parasomnia:  He exhibits the following:  sleep talks  excessive sweating while sleeping    Weight:       10/25/23  1126   Weight: 90.1 kg (198 lb 9.6 oz)      Weight change in the last year:  gain: 0 lbs    The patient's relevant past medical, surgical, family, and social history reviewed and updated in Epic as appropriate.    Review of Systems   Constitutional: Negative.    HENT:  Positive for congestion and sore throat.    Eyes: Negative.    Respiratory:  Positive for apnea.    Cardiovascular: Negative.    Gastrointestinal: Negative.    Endocrine: Negative.    Genitourinary: Negative.    Musculoskeletal: Negative.    Skin: Negative.    Allergic/Immunologic: Positive for environmental allergies.   Neurological: Negative.    Hematological: Negative.    Psychiatric/Behavioral:  Positive for decreased concentration. The patient is nervous/anxious.    All other systems reviewed and are negative.      PMH:    Past Medical History:   Diagnosis Date    Anxiety     H/O esophageal reflux     Hypertension     Influenza     Testosterone deficiency October 2021    Tobacco abuse     Toe pain      Past Surgical History:   Procedure Laterality Date    HERNIA REPAIR      WISDOM TOOTH EXTRACTION         Allergies   Allergen Reactions    Sulfa Antibiotics        MEDS:  Prior to Admission medications    Medication Sig Start Date End Date Taking? Authorizing Provider   adapalene (DIFFERIN) 0.1 % cream APPLY TOPICALLY TO THE APPROPRIATE AREA AS DIRECTED EVERY NIGHT. 7/9/23   Levar Tanner MD   benzoyl peroxide 5 % external liquid Apply  topically to the appropriate area as directed 2 (Two) Times a Day. 11/11/22 11/11/23  Julianna Muñoz MD   fluticasone (FLONASE) 50 MCG/ACT nasal spray INSTILL 2 SPRAYS INTO THE NOSTRILS AS DIRECTED BY  "PROVIDER DAILY 5/12/23   Levar Tanner MD   ipratropium (ATROVENT) 0.06 % nasal spray 2 sprays into the nostril(s) as directed by provider 4 (Four) Times a Day. 10/28/22   Julianna Muñoz MD   levocetirizine (XYZAL) 5 MG tablet TAKE 1 TABLET BY MOUTH EVERY DAY IN THE EVENING 10/6/23   Lena Baig DO   losartan (COZAAR) 50 MG tablet Take 1 tablet by mouth Daily. 5/4/23   Levar Tanner MD   mesalamine (LIALDA) 1.2 g EC tablet Take 2 tablets by mouth Daily. 3/10/23   Provider, MD Tonya   Needle, Disp, (Hypodermic Needle) 18G X 1\" misc Use every week to drawn up testosterone 9/19/22   Kenny Ryan MD   Syringe 21G X 1-1/4\" 3 ML misc USE TO INJECT TESTOSTERONE EVERY WEEK 9/19/22   Kenny Ryan MD   Testosterone Cypionate (DEPOTESTOTERONE CYPIONATE) 200 MG/ML injection INJECT 0.5 ML AS DIRECTED 1 (ONE) TIME PER WEEK 7/10/23   Larry Aragon MD       FH:  Family History   Problem Relation Age of Onset    Sleep apnea Mother     Diabetes Mother     Heart disease Father     Hypertension Father     Diabetes Maternal Grandfather        Objective   Vital Signs:  /73 (BP Location: Left arm, Patient Position: Sitting)   Pulse 70   Ht 177.8 cm (70\")   Wt 90.1 kg (198 lb 9.6 oz)   SpO2 97%   BMI 28.50 kg/m²     Patient's (Body mass index is 28.5 kg/m².) indicates that they are overweight (BMI 25-29.9) with health related conditions that include obstructive sleep apnea, hypertension, coronary heart disease, and diabetes mellitus . Weight is newly identified. BMI is is above average; BMI management plan is completed. We discussed increasing exercise and triying to eat well .            Physical Exam  Vitals reviewed.   Constitutional:       Appearance: Normal appearance.   HENT:      Head: Normocephalic and atraumatic.      Nose: Nose normal.      Mouth/Throat:      Mouth: Mucous membranes are moist.   Cardiovascular:      Rate and Rhythm: Normal rate and regular rhythm. "      Heart sounds: No murmur heard.     No friction rub. No gallop.   Pulmonary:      Effort: Pulmonary effort is normal. No respiratory distress.      Breath sounds: Normal breath sounds. No wheezing or rhonchi.   Neurological:      Mental Status: He is alert and oriented to person, place, and time.   Psychiatric:         Behavior: Behavior normal.         Mallampati Score: IV (only hard palate visible)    Result Review :              Assessment and Plan  Iam Blanco is a 53 y.o. male with a past medical history of pretension, anxiety who presents for further evaluation of excessive daytime sleepiness and fatigue, nonrestorative sleep, and concerns for sleep disordered breathing and obstructive sleep apnea. The patient's symptoms, particularly snoring, and witnessed episodes of apnea/pauses are concerning for significant sleep disordered breathing and obstructive sleep apnea. We will obtain a home sleep test for further evaluation.  The patient will return for follow-up and recommendations after test.  I have discussed weight loss as it pertains to obstructive sleep apnea.    Diagnoses and all orders for this visit:    1. Snoring (Primary)  -     Home Sleep Study; Future    2. Suspected sleep apnea  -     Home Sleep Study; Future    3. Excessive daytime sleepiness  -     Home Sleep Study; Future    4. Overweight (BMI 25.0-29.9)                 I discussed the consequences of uncontrolled sleep apnea including hypertension, heart disease, diabetes, stroke, and dementia. I further discussed sleep apnea therapeutic options including CPAP, Weight loss, Oral dental appliance, and surgery.         Follow Up  Return for Follow up after study.  Patient was given instructions and counseling regarding his condition or for health maintenance advice. Please see specific information pulled into the AVS if appropriate.     RYANN Curiel, ACNP-BC  Pulmonology, Critical Care, and Sleep Medicine

## 2023-10-25 ENCOUNTER — OFFICE VISIT (OUTPATIENT)
Dept: SLEEP MEDICINE | Facility: HOSPITAL | Age: 53
End: 2023-10-25
Payer: COMMERCIAL

## 2023-10-25 VITALS
WEIGHT: 198.6 LBS | DIASTOLIC BLOOD PRESSURE: 73 MMHG | HEART RATE: 70 BPM | BODY MASS INDEX: 28.43 KG/M2 | SYSTOLIC BLOOD PRESSURE: 129 MMHG | OXYGEN SATURATION: 97 % | HEIGHT: 70 IN

## 2023-10-25 DIAGNOSIS — G47.19 EXCESSIVE DAYTIME SLEEPINESS: ICD-10-CM

## 2023-10-25 DIAGNOSIS — R06.83 SNORING: Primary | ICD-10-CM

## 2023-10-25 DIAGNOSIS — E66.3 OVERWEIGHT (BMI 25.0-29.9): ICD-10-CM

## 2023-10-25 DIAGNOSIS — R29.818 SUSPECTED SLEEP APNEA: ICD-10-CM

## 2023-11-14 ENCOUNTER — HOSPITAL ENCOUNTER (OUTPATIENT)
Dept: SLEEP MEDICINE | Facility: HOSPITAL | Age: 53
Discharge: HOME | End: 2023-11-14
Admitting: NURSE PRACTITIONER
Payer: COMMERCIAL

## 2023-11-14 VITALS — WEIGHT: 198 LBS | HEIGHT: 70 IN | BODY MASS INDEX: 28.35 KG/M2

## 2023-11-14 DIAGNOSIS — G47.19 EXCESSIVE DAYTIME SLEEPINESS: ICD-10-CM

## 2023-11-14 DIAGNOSIS — R29.818 SUSPECTED SLEEP APNEA: ICD-10-CM

## 2023-11-14 DIAGNOSIS — R06.83 SNORING: ICD-10-CM

## 2023-11-14 PROCEDURE — 95800 SLP STDY UNATTENDED: CPT

## 2023-11-16 DIAGNOSIS — G47.33 OSA (OBSTRUCTIVE SLEEP APNEA): Primary | ICD-10-CM

## 2023-11-16 DIAGNOSIS — I10 HYPERTENSION, UNSPECIFIED TYPE: ICD-10-CM

## 2023-12-01 ENCOUNTER — OFFICE VISIT (OUTPATIENT)
Dept: FAMILY MEDICINE CLINIC | Facility: CLINIC | Age: 53
End: 2023-12-01
Payer: COMMERCIAL

## 2023-12-01 VITALS
TEMPERATURE: 98 F | SYSTOLIC BLOOD PRESSURE: 118 MMHG | HEIGHT: 70 IN | DIASTOLIC BLOOD PRESSURE: 68 MMHG | OXYGEN SATURATION: 97 % | HEART RATE: 95 BPM | BODY MASS INDEX: 29.29 KG/M2 | WEIGHT: 204.6 LBS

## 2023-12-01 DIAGNOSIS — Z12.83 SKIN CANCER SCREENING: Primary | ICD-10-CM

## 2023-12-01 DIAGNOSIS — L98.9 SKIN LESION: ICD-10-CM

## 2023-12-01 DIAGNOSIS — Z23 IMMUNIZATION DUE: ICD-10-CM

## 2023-12-01 DIAGNOSIS — J30.2 SEASONAL ALLERGIES: ICD-10-CM

## 2023-12-01 RX ORDER — ZOSTER VACCINE RECOMBINANT, ADJUVANTED 50 MCG/0.5
0.5 KIT INTRAMUSCULAR
Qty: 1 EACH | Refills: 1 | Status: SHIPPED | OUTPATIENT
Start: 2023-12-01

## 2023-12-01 RX ORDER — FLUTICASONE PROPIONATE 50 MCG
2 SPRAY, SUSPENSION (ML) NASAL DAILY
Qty: 16 ML | Refills: 11 | Status: SHIPPED | OUTPATIENT
Start: 2023-12-01 | End: 2023-12-01 | Stop reason: SDUPTHER

## 2023-12-01 RX ORDER — FLUTICASONE PROPIONATE 50 MCG
2 SPRAY, SUSPENSION (ML) NASAL DAILY
Qty: 48 ML | Refills: 3 | Status: SHIPPED | OUTPATIENT
Start: 2023-12-01

## 2023-12-01 NOTE — PROGRESS NOTES
"     Follow Up Office Visit      Patient Name: Iam Blanco  : 1970   MRN: [id number]     Chief Complaint:    Chief Complaint   Patient presents with   • Eye Problem     Left lower under lid is red.       History of Present Illness: Iam Blanco is a 53 y.o. male who is here today to follow up with red lesion underneath the left eye.  Been there for couple weeks.  Started to get better but got worse again.  Itch or cause any pain.  Patient has a family history of skin cancer and would like to see a dermatologist.    Former smoker.  May need lung cancer screening at age 57.    Hypogonadism-he is followed by urology for testosterone replacement.    Hypertension-well-controlled.  Current medication is losartan and doing well with current dose of 50 mg daily.    Seasonal allergies-controlled on Flonase.  Uses it daily.  He is refill      Physical exam: Heart exam RRR.  Lungs MCT bilateral.  Erythema inferior to left eye.  Very small sub-2 mm papular component.  Small blood vessels noted, but no worrisome signs otherwise.      Subjective        I have reviewed and the following portions of the patient's history were updated as appropriate: past family history, past medical history, past social history, past surgical history and problem list.    Medications:     Current Outpatient Medications:   •  fluticasone (FLONASE) 50 MCG/ACT nasal spray, 2 sprays into the nostril(s) as directed by provider Daily., Disp: 48 mL, Rfl: 3  •  levocetirizine (XYZAL) 5 MG tablet, TAKE 1 TABLET BY MOUTH EVERY DAY IN THE EVENING, Disp: 90 tablet, Rfl: 0  •  losartan (COZAAR) 50 MG tablet, Take 1 tablet by mouth Daily., Disp: 90 tablet, Rfl: 2  •  mesalamine (LIALDA) 1.2 g EC tablet, Take 2 tablets by mouth Daily., Disp: , Rfl:   •  Needle, Disp, (Hypodermic Needle) 18G X 1\" misc, Use every week to drawn up testosterone, Disp: 20 each, Rfl: 3  •  Syringe 21G X 1-1/4\" 3 ML misc, USE TO INJECT TESTOSTERONE EVERY WEEK, Disp: 20 " "each, Rfl: 3  •  Testosterone Cypionate (DEPOTESTOTERONE CYPIONATE) 200 MG/ML injection, INJECT 0.5 ML AS DIRECTED 1 (ONE) TIME PER WEEK, Disp: 4 mL, Rfl: 4  •  Zoster Vac Recomb Adjuvanted (Shingrix) 50 MCG/0.5ML reconstituted suspension, Inject 0.5 mL into the appropriate muscle as directed by prescriber Every 6 (Six) Months. Stop after two doses, Disp: 1 each, Rfl: 1    Allergies:   Allergies   Allergen Reactions   • Sulfa Antibiotics        Objective     Physical Exam: Please see above  Vital Signs:   Vitals:    12/01/23 0845   BP: 118/68   Pulse: 95   Temp: 98 °F (36.7 °C)   SpO2: 97%   Weight: 92.8 kg (204 lb 9.6 oz)   Height: 177.8 cm (70\")     Body mass index is 29.36 kg/m².          Assessment / Plan      Assessment/Plan:   Diagnoses and all orders for this visit:    1. Skin cancer screening (Primary)  -     Ambulatory Referral to Dermatology    2. Skin lesion    3. Seasonal allergies  -     Discontinue: fluticasone (FLONASE) 50 MCG/ACT nasal spray; 2 sprays into the nostril(s) as directed by provider Daily.  Dispense: 16 mL; Refill: 11  -     fluticasone (FLONASE) 50 MCG/ACT nasal spray; 2 sprays into the nostril(s) as directed by provider Daily.  Dispense: 48 mL; Refill: 3    4. Immunization due  -     Zoster Vac Recomb Adjuvanted (Shingrix) 50 MCG/0.5ML reconstituted suspension; Inject 0.5 mL into the appropriate muscle as directed by prescriber Every 6 (Six) Months. Stop after two doses  Dispense: 1 each; Refill: 1    Skin lesion is likely just eczema it is type of skin lesion.  Would monitor going forward.  If any changes, may require biopsy.    Refer patient to dermatology for skin cancer screening.  We do not perform skin cancer screening in primary care.  Shingles shot and health maintenance discussed today.  Send shingles vaccine to pharmacy.  Discussed lung cancer screening age 57 with patient.  Patient to follow-up for annual exam.      Follow Up:   Return in about 6 months (around 6/1/2024) for " Annual.    Durga Hernandez DO  [facility] [place]

## 2023-12-05 DIAGNOSIS — L70.9 ACNE, UNSPECIFIED ACNE TYPE: ICD-10-CM

## 2023-12-06 RX ORDER — ADAPALENE 0.1 G/100G
CREAM TOPICAL
Qty: 45 G | Refills: 5 | OUTPATIENT
Start: 2023-12-06

## 2023-12-19 DIAGNOSIS — R79.89 LOW TESTOSTERONE IN MALE: ICD-10-CM

## 2023-12-19 RX ORDER — TESTOSTERONE CYPIONATE 200 MG/ML
INJECTION, SOLUTION INTRAMUSCULAR
Qty: 2 ML | Refills: 3 | Status: SHIPPED | OUTPATIENT
Start: 2023-12-19 | End: 2023-12-22 | Stop reason: SDUPTHER

## 2023-12-19 NOTE — TELEPHONE ENCOUNTER
Rx Refill Note  Requested Prescriptions     Pending Prescriptions Disp Refills    Testosterone Cypionate (DEPOTESTOTERONE CYPIONATE) 200 MG/ML injection [Pharmacy Med Name: TESTOSTERONE  MG/ML] 2 mL 3     Sig: INJECT 0.5 ML AS DIRECTED 1 (ONE) TIME PER WEEK    Next OV 03/26/24  Last office visit with prescribing clinician: Visit date not found   Last telemedicine visit with prescribing clinician: Visit date not found   Next office visit with prescribing clinician: Visit date not found                         Would you like a call back once the refill request has been completed: [] Yes [] No    If the office needs to give you a call back, can they leave a voicemail: [] Yes [] No    Shikha Dupont MA  12/19/23, 14:43 EST

## 2023-12-22 DIAGNOSIS — R79.89 LOW TESTOSTERONE IN MALE: ICD-10-CM

## 2023-12-22 RX ORDER — TESTOSTERONE CYPIONATE 200 MG/ML
INJECTION, SOLUTION INTRAMUSCULAR
Qty: 4 ML | Refills: 3 | Status: SHIPPED | OUTPATIENT
Start: 2023-12-22

## 2024-01-31 NOTE — PROGRESS NOTES
Sleep Clinic Follow Up Note    Chief Complaint  Follow-up    Subjective     History of Present Illness (from previous encounter on 10/25/2023):  Iam Blanco is a 53 y.o. male with a history of hypertension, anxiety, and tobacco use.  The patient is referred by Levar Tanner MD with primary care.  Patient has had witnessed episodes of snoring.  Review of patient's self-reported questionnaire notes symptoms including the aforementioned snoring, witnessed apnea, frequent awakening, nonrestorative sleep, dry mouth, sore throat, nasal allergies, difficulty falling and staying asleep, acting out dreams, and lack of concentration.  Patient typically goes to bed 11 PM-12 AM waking at 6 AM on weekdays and waking at 6-9 AM on weekends.  He estimates an average of 6 hours of sleep per night and it takes approximately 30 minutes for him to get to sleep.  Patient is a former smoker, and denies use of alcohol or illicit/recreational drugs.  He drinks 6 cups of regular coffee daily.  The patient's wife reports witnessed episodes of apnea, and pauses in breathing which occur occasionally.  Additionally the patient is observed with heavy snoring which occurs continuously, nightly, and in all sleeping positions.  Patient also is seen talking in his sleep. (End copied text).    -A home sleep test was obtained on 11/15/2023 revealing mild obstructive sleep apnea with an AHI of 8.4/H.    Interval History:  Iam Blanco is a 53 y.o. male returns for follow up and compliance of PAP therapy. The patient was last seen on 10/25/2023. Overall the patient feels good with regard to therapy. The device appears to be working appropriately. On average the patient sleeps 6-7 hours per night. The patient wakes 1 time per night. He feels much improved with the device.    The patient reports the following changes to their medical and medication history since they were last seen:  None      Further details are as follows:      [place] Scale  "is (out of 24): Total score: 9     Weight:  Current Weight: 204 lb    The patient's relevant past medical, surgical, family, and social history reviewed and updated in Epic as appropriate.    PMH:    Past Medical History:   Diagnosis Date    Anxiety     H/O esophageal reflux     Hypertension     Influenza     Testosterone deficiency October 2021    Tobacco abuse     Toe pain      Past Surgical History:   Procedure Laterality Date    HERNIA REPAIR      WISDOM TOOTH EXTRACTION         Allergies   Allergen Reactions    Sulfa Antibiotics        MEDS:  Prior to Admission medications    Medication Sig Start Date End Date Taking? Authorizing Provider   fluticasone (FLONASE) 50 MCG/ACT nasal spray 2 sprays into the nostril(s) as directed by provider Daily. 12/1/23   Durga Hernandez DO   levocetirizine (XYZAL) 5 MG tablet TAKE 1 TABLET BY MOUTH EVERY DAY IN THE EVENING 10/6/23   Lena Baig DO   losartan (COZAAR) 50 MG tablet Take 1 tablet by mouth Daily. 5/4/23   Levar Tanner MD   mesalamine (LIALDA) 1.2 g EC tablet Take 2 tablets by mouth Daily. 3/10/23   ProviderTonya MD   Needle, Disp, (Hypodermic Needle) 18G X 1\" misc Use every week to drawn up testosterone 9/19/22   Kenny Ryan MD   Syringe 21G X 1-1/4\" 3 ML misc USE TO INJECT TESTOSTERONE EVERY WEEK 9/19/22   Kenny Ryan MD   Testosterone Cypionate (DEPOTESTOTERONE CYPIONATE) 200 MG/ML injection 1/2 ml  I.m. weekly. Dispense 4 single use vials 12/22/23   Kenny Ryan MD   Zoster Vac Recomb Adjuvanted (Shingrix) 50 MCG/0.5ML reconstituted suspension Inject 0.5 mL into the appropriate muscle as directed by prescriber Every 6 (Six) Months. Stop after two doses 12/1/23   Durga Hernandez DO         FH:  Family History   Problem Relation Age of Onset    Sleep apnea Mother     Diabetes Mother     Heart disease Father     Hypertension Father     Diabetes Maternal Grandfather        Objective   Vital Signs:  /75 (BP " "Location: Left arm, Patient Position: Sitting, Cuff Size: Adult)   Pulse 72   Ht 177.8 cm (70\")   Wt 94.3 kg (207 lb 12.8 oz)   SpO2 96%   BMI 29.82 kg/m²     Patient's (Body mass index is 29.82 kg/m².) indicates that they are overweight (BMI 25-29.9) with health related conditions that include obstructive sleep apnea . Weight is unchanged. BMI is is above average; BMI management plan is completed. We discussed portion control and increasing exercise.            Physical Exam  Vitals reviewed.   Constitutional:       Appearance: Normal appearance.   HENT:      Head: Normocephalic and atraumatic.      Nose: Nose normal.      Mouth/Throat:      Mouth: Mucous membranes are moist.   Cardiovascular:      Rate and Rhythm: Normal rate and regular rhythm.      Heart sounds: No murmur heard.     No friction rub. No gallop.   Pulmonary:      Effort: Pulmonary effort is normal. No respiratory distress.      Breath sounds: Normal breath sounds. No wheezing or rhonchi.   Neurological:      Mental Status: He is alert and oriented to person, place, and time.   Psychiatric:         Behavior: Behavior normal.               Result Review :           PAP Report:  AHI: 0.5/h  Days of Usage: 52/52 (100%)  Number of Days Greater than 4 hours: 98%  Average time (days used): 6 hours 42 minutes  95th Percentile Pressure: 13.0 cm H2O  95th percentile leaks: 11.1 L/min  Settings: Auto CPAP-8/18 cm H2O, EPR full-time, EPR level 1, response standard.       Assessment and Plan  Iam Blanco is a 53 y.o. male who returns for follow-up and compliance of PAP therapy.  The Pap report has been reviewed.  Overall usage is at 100% with compliance at 98%.  The patient averages 6 hours 42 minutes of therapy.  Sleep apnea is controlled with an AHI of 0.5/H.  He has a history of mild sleep apnea with an initial AHI of 8.4/H. I will refill the patient's supplies, and I have asked them to return for follow-up and compliance in 1 year or sooner " should they have further questions or concerns.     Diagnoses and all orders for this visit:    1. MARS (obstructive sleep apnea) (Primary)  -     PAP Therapy    2. Overweight (BMI 25.0-29.9)           The patient continues to use and benefit from PAP therapy.    1. The patient was counseled regarding multimodal approach with healthy nutrition, healthy sleep, regular physical activity, social activities, counseling, and medications. Encouraged to practice lateral sleep position. Avoid alcohol and sedatives close to bedtime.     2.  We will refill supplies x1 year.  Return to clinic 1 year or sooner if symptoms warrant. I have reviewed the results of my evaluation and impression and discussed my recommendations in detail with the patient.           Follow Up  Return in about 1 year (around 2/5/2025) for Annual visit.  Patient was given instructions and counseling regarding his condition or for health maintenance advice. Please see specific information pulled into the AVS if appropriate.       RYANN Curiel, ACNP-BC  Pulmonology, Critical Care, and Sleep Medicine

## 2024-02-05 ENCOUNTER — OFFICE VISIT (OUTPATIENT)
Dept: SLEEP MEDICINE | Facility: HOSPITAL | Age: 54
End: 2024-02-05
Payer: COMMERCIAL

## 2024-02-05 VITALS
WEIGHT: 207.8 LBS | HEIGHT: 70 IN | OXYGEN SATURATION: 96 % | DIASTOLIC BLOOD PRESSURE: 75 MMHG | HEART RATE: 72 BPM | BODY MASS INDEX: 29.75 KG/M2 | SYSTOLIC BLOOD PRESSURE: 152 MMHG

## 2024-02-05 DIAGNOSIS — E66.3 OVERWEIGHT (BMI 25.0-29.9): ICD-10-CM

## 2024-02-05 DIAGNOSIS — G47.33 OSA (OBSTRUCTIVE SLEEP APNEA): Primary | ICD-10-CM

## 2024-02-05 PROCEDURE — 99213 OFFICE O/P EST LOW 20 MIN: CPT | Performed by: NURSE PRACTITIONER

## 2024-03-05 RX ORDER — LEVOCETIRIZINE DIHYDROCHLORIDE 5 MG/1
TABLET, FILM COATED ORAL
Qty: 90 TABLET | Refills: 0 | Status: SHIPPED | OUTPATIENT
Start: 2024-03-05

## 2024-03-15 DIAGNOSIS — R79.89 LOW TESTOSTERONE IN MALE: ICD-10-CM

## 2024-03-15 RX ORDER — TESTOSTERONE CYPIONATE 200 MG/ML
INJECTION, SOLUTION INTRAMUSCULAR
Qty: 4 ML | Refills: 0 | Status: SHIPPED | OUTPATIENT
Start: 2024-03-15

## 2024-03-15 NOTE — TELEPHONE ENCOUNTER
Rx Refill Note  Requested Prescriptions     Pending Prescriptions Disp Refills    Testosterone Cypionate (DEPOTESTOTERONE CYPIONATE) 200 MG/ML injection 4 mL 3     Si/2 ml  I.m. weekly. Dispense 4 single use vials      Last office visit with prescribing clinician: 2023     Next office visit with prescribing clinician: 3/26/2024 LORRIE AGUILAR

## 2024-03-26 ENCOUNTER — OFFICE VISIT (OUTPATIENT)
Dept: ENDOCRINOLOGY | Facility: CLINIC | Age: 54
End: 2024-03-26
Payer: COMMERCIAL

## 2024-03-26 VITALS
BODY MASS INDEX: 29.06 KG/M2 | SYSTOLIC BLOOD PRESSURE: 120 MMHG | OXYGEN SATURATION: 98 % | HEIGHT: 70 IN | WEIGHT: 203 LBS | HEART RATE: 60 BPM | DIASTOLIC BLOOD PRESSURE: 70 MMHG

## 2024-03-26 DIAGNOSIS — R79.89 LOW TESTOSTERONE IN MALE: ICD-10-CM

## 2024-03-26 LAB — TESTOST SERPL-MCNC: 471 NG/DL (ref 193–740)

## 2024-03-26 PROCEDURE — 99213 OFFICE O/P EST LOW 20 MIN: CPT | Performed by: INTERNAL MEDICINE

## 2024-03-26 PROCEDURE — 84403 ASSAY OF TOTAL TESTOSTERONE: CPT | Performed by: INTERNAL MEDICINE

## 2024-03-26 PROCEDURE — 85027 COMPLETE CBC AUTOMATED: CPT | Performed by: INTERNAL MEDICINE

## 2024-03-26 RX ORDER — TESTOSTERONE CYPIONATE 200 MG/ML
INJECTION, SOLUTION INTRAMUSCULAR
Qty: 4 ML | Refills: 5 | Status: SHIPPED | OUTPATIENT
Start: 2024-03-26

## 2024-03-26 RX ORDER — TESTOSTERONE CYPIONATE 200 MG/ML
INJECTION, SOLUTION INTRAMUSCULAR
Qty: 4 ML | Refills: 0 | Status: SHIPPED | OUTPATIENT
Start: 2024-03-26 | End: 2024-03-26 | Stop reason: SDUPTHER

## 2024-03-26 NOTE — PROGRESS NOTES
"     Office Note      Date: 2024  Patient Name: Iam Blanco  MRN: [id number]  : 1970  Cc: follow up for low T      History of Present Illness:   Iam Blanco is a 53 y.o. male who presents for  low T  Current rx:takes 100 mg per week but pharmacy has trouble understanding that. They give him a single use 200 mg dose and expect him  to get 2 doses out of a SINGLE USE VIAL  !!!!    Changes in history: none   Questions /problems:none     Subjective          Review of Systems:   Review of Systems   Genitourinary:         Libido has declined    Psychiatric/Behavioral:          Was diagnosed with sleep apnea and is on mask now. Feels better       The following portions of the patient's history were reviewed and updated as appropriate: allergies, current medications, past family history, past medical history, past social history, past surgical history, and problem list.    Objective     Visit Vitals  /70 (BP Location: Left arm, Patient Position: Sitting, Cuff Size: Adult)   Pulse 60   Ht 177.8 cm (70\")   Wt 92.1 kg (203 lb)   SpO2 98%   BMI 29.13 kg/m²           Physical Exam:  Physical Exam  Vitals reviewed.   Constitutional:       Appearance: Normal appearance.   Neurological:      Mental Status: He is alert.   Psychiatric:         Mood and Affect: Mood normal.         Behavior: Behavior normal.         Thought Content: Thought content normal.         Judgment: Judgment normal.         Assessment / Plan      Assessment & Plan:  Problem List Items Addressed This Visit          Other    Low testosterone in male    Overview     Has had 2 low testosterone levels  Started replacement in 2022         Current Assessment & Plan     Stable          Relevant Medications    Testosterone Cypionate (DEPOTESTOTERONE CYPIONATE) 200 MG/ML injection    Other Relevant Orders    Testosterone    CBC (No Diff)        Electronically signed by : Kenny Ryan MD   2024    "

## 2024-03-27 LAB
DEPRECATED RDW RBC AUTO: 41.2 FL (ref 37–54)
ERYTHROCYTE [DISTWIDTH] IN BLOOD BY AUTOMATED COUNT: 13.9 % (ref 12.3–15.4)
HCT VFR BLD AUTO: 56.6 % (ref 37.5–51)
HGB BLD-MCNC: 19.2 G/DL (ref 13–17.7)
MCH RBC QN AUTO: 29.2 PG (ref 26.6–33)
MCHC RBC AUTO-ENTMCNC: 33.9 G/DL (ref 31.5–35.7)
MCV RBC AUTO: 86 FL (ref 79–97)
PLATELET # BLD AUTO: 214 10*3/MM3 (ref 140–450)
PMV BLD AUTO: 12.3 FL (ref 6–12)
RBC # BLD AUTO: 6.58 10*6/MM3 (ref 4.14–5.8)
WBC NRBC COR # BLD AUTO: 8.22 10*3/MM3 (ref 3.4–10.8)

## 2024-05-06 RX ORDER — LEVOCETIRIZINE DIHYDROCHLORIDE 5 MG/1
TABLET, FILM COATED ORAL
Qty: 90 TABLET | Refills: 0 | Status: SHIPPED | OUTPATIENT
Start: 2024-05-06

## 2024-06-06 ENCOUNTER — TELEPHONE (OUTPATIENT)
Dept: ENDOCRINOLOGY | Facility: CLINIC | Age: 54
End: 2024-06-06

## 2024-06-06 NOTE — TELEPHONE ENCOUNTER
Needs PA for    Testosterone Cypionate (DEPOTESTOTERONE CYPIONATE) 200 MG/ML injection     Send to    [facility]  [phone]

## 2024-06-07 ENCOUNTER — OFFICE VISIT (OUTPATIENT)
Dept: FAMILY MEDICINE CLINIC | Facility: CLINIC | Age: 54
End: 2024-06-07
Payer: COMMERCIAL

## 2024-06-07 ENCOUNTER — LAB (OUTPATIENT)
Dept: LAB | Facility: HOSPITAL | Age: 54
End: 2024-06-07
Payer: COMMERCIAL

## 2024-06-07 VITALS
DIASTOLIC BLOOD PRESSURE: 76 MMHG | RESPIRATION RATE: 21 BRPM | HEART RATE: 85 BPM | HEIGHT: 70 IN | OXYGEN SATURATION: 95 % | BODY MASS INDEX: 28.29 KG/M2 | WEIGHT: 197.6 LBS | SYSTOLIC BLOOD PRESSURE: 112 MMHG | TEMPERATURE: 98.3 F

## 2024-06-07 DIAGNOSIS — E78.41 ELEVATED LIPOPROTEIN(A): ICD-10-CM

## 2024-06-07 DIAGNOSIS — I10 ESSENTIAL HYPERTENSION: ICD-10-CM

## 2024-06-07 DIAGNOSIS — R79.89 LOW TESTOSTERONE IN MALE: ICD-10-CM

## 2024-06-07 DIAGNOSIS — Z87.891 PERSONAL HISTORY OF NICOTINE DEPENDENCE: ICD-10-CM

## 2024-06-07 DIAGNOSIS — Z00.00 ANNUAL PHYSICAL EXAM: Primary | ICD-10-CM

## 2024-06-07 LAB
ALBUMIN SERPL-MCNC: 4.6 G/DL (ref 3.5–5.2)
ALBUMIN/GLOB SERPL: 1.8 G/DL
ALP SERPL-CCNC: 50 U/L (ref 39–117)
ALT SERPL W P-5'-P-CCNC: 43 U/L (ref 1–41)
ANION GAP SERPL CALCULATED.3IONS-SCNC: 11.4 MMOL/L (ref 5–15)
AST SERPL-CCNC: 25 U/L (ref 1–40)
BILIRUB SERPL-MCNC: 0.6 MG/DL (ref 0–1.2)
BUN SERPL-MCNC: 16 MG/DL (ref 6–20)
BUN/CREAT SERPL: 14.5 (ref 7–25)
CALCIUM SPEC-SCNC: 9.7 MG/DL (ref 8.6–10.5)
CHLORIDE SERPL-SCNC: 102 MMOL/L (ref 98–107)
CHOLEST SERPL-MCNC: 150 MG/DL (ref 0–200)
CO2 SERPL-SCNC: 23.6 MMOL/L (ref 22–29)
CREAT SERPL-MCNC: 1.1 MG/DL (ref 0.76–1.27)
EGFRCR SERPLBLD CKD-EPI 2021: 80.8 ML/MIN/1.73
GLOBULIN UR ELPH-MCNC: 2.6 GM/DL
GLUCOSE SERPL-MCNC: 107 MG/DL (ref 65–99)
HDLC SERPL-MCNC: 44 MG/DL (ref 40–60)
LDLC SERPL CALC-MCNC: 96 MG/DL (ref 0–100)
LDLC/HDLC SERPL: 2.2 {RATIO}
POTASSIUM SERPL-SCNC: 4.2 MMOL/L (ref 3.5–5.2)
PROT SERPL-MCNC: 7.2 G/DL (ref 6–8.5)
SODIUM SERPL-SCNC: 137 MMOL/L (ref 136–145)
TRIGL SERPL-MCNC: 46 MG/DL (ref 0–150)
TSH SERPL DL<=0.05 MIU/L-ACNC: 2.06 UIU/ML (ref 0.27–4.2)
VLDLC SERPL-MCNC: 10 MG/DL (ref 5–40)

## 2024-06-07 PROCEDURE — 80061 LIPID PANEL: CPT

## 2024-06-07 PROCEDURE — 84443 ASSAY THYROID STIM HORMONE: CPT

## 2024-06-07 PROCEDURE — 99396 PREV VISIT EST AGE 40-64: CPT | Performed by: FAMILY MEDICINE

## 2024-06-07 PROCEDURE — 80053 COMPREHEN METABOLIC PANEL: CPT

## 2024-06-07 RX ORDER — LOSARTAN POTASSIUM 50 MG/1
50 TABLET ORAL DAILY
Qty: 90 TABLET | Refills: 3 | Status: SHIPPED | OUTPATIENT
Start: 2024-06-07

## 2024-06-07 RX ORDER — TESTOSTERONE CYPIONATE 200 MG/ML
INJECTION, SOLUTION INTRAMUSCULAR
Qty: 4 ML | Refills: 5 | Status: SHIPPED | OUTPATIENT
Start: 2024-06-07

## 2024-06-07 NOTE — PROGRESS NOTES
"     Follow Up Office Visit      Patient Name: Iam Blanco  : 1970   MRN: [id number]     Chief Complaint:    Chief Complaint   Patient presents with    Annual Exam       History of Present Illness: Iam Blanco is a 54 y.o. male who is here today to follow up with hypogonadism and hypertension and allergies.  Patient presents for annual exam.  He is doing well on testosterone replacement.  His blood pressure is well-controlled.  He has no concerns today.  For his annual exam we discussed diet, exercise, vaccines, lung cancer screening.  Patient is up-to-date with colorectal cancer screening prostate cancer screening.      Physical exam: Patient's mood and affect is appropriate.  Neurological exam grossly intact.  Thyroid midline normal.  No lymphadenopathy in the cervical region.  Patient's heart exam RRR.  Lung exam CTA bilateral.      Subjective        I have reviewed and the following portions of the patient's history were updated as appropriate: past family history, past medical history, past social history, past surgical history and problem list.    Medications:     Current Outpatient Medications:     fluticasone (FLONASE) 50 MCG/ACT nasal spray, 2 sprays into the nostril(s) as directed by provider Daily., Disp: 48 mL, Rfl: 3    levocetirizine (XYZAL) 5 MG tablet, TAKE 1 TABLET BY MOUTH EVERY DAY IN THE EVENING, Disp: 90 tablet, Rfl: 0    losartan (COZAAR) 50 MG tablet, Take 1 tablet by mouth Daily., Disp: 90 tablet, Rfl: 3    mesalamine (LIALDA) 1.2 g EC tablet, Take 2 tablets by mouth Daily., Disp: , Rfl:     Needle, Disp, (Hypodermic Needle) 18G X 1\" misc, Use every week to drawn up testosterone, Disp: 20 each, Rfl: 3    Syringe 21G X 1-/\" 3 ML misc, USE TO INJECT TESTOSTERONE EVERY WEEK, Disp: 20 each, Rfl: 3    Testosterone Cypionate (DEPOTESTOTERONE CYPIONATE) 200 MG/ML injection, 1 ml weekly. Dispense 4 single use vials, Disp: 4 mL, Rfl: 5    Allergies:   Allergies   Allergen " "Reactions    Sulfa Antibiotics        Objective     Physical Exam: Please see above  Vital Signs:   Vitals:    06/07/24 0811   BP: 112/76   BP Location: Left arm   Patient Position: Sitting   Cuff Size: Adult   Pulse: 85   Resp: 21   Temp: 98.3 °F (36.8 °C)   TempSrc: Temporal   SpO2: 95%   Weight: 89.6 kg (197 lb 9.6 oz)   Height: 177.8 cm (70\")     Body mass index is 28.35 kg/m².          Assessment / Plan      Assessment/Plan:   Diagnoses and all orders for this visit:    1. Annual physical exam (Primary)    2. Essential hypertension  -     losartan (COZAAR) 50 MG tablet; Take 1 tablet by mouth Daily.  Dispense: 90 tablet; Refill: 3  -     Comprehensive Metabolic Panel; Future  -     TSH Rfx On Abnormal To Free T4; Future    3. Personal history of nicotine dependence  -      CT Chest Low Dose Cancer Screening WO; Future    4. Elevated lipoprotein(a)  -     Lipid Panel; Future    Hypertension controlled-continue current medication.  Follow-up yearly.    CT lung cancer screening ordered.  Lipids rechecked ASCVD risk low at this time at 3.6%.      Annual exam counseling: Counseled patient regards to the ASCVD risk, lung cancer screening, prostate cancer screening, colorectal cancer screening, vaccines, diet and exercise.  Encourage patient to exercise more often and to have a diet with healthy food options.  Encourage patient to get lung cancer screening and shingles vaccine.  Advised patient his ASCVD risk is 3.6%.  Do not recommend statin therapy at this time.    Follow Up:   Return in about 1 year (around 6/7/2025) for Labs today, Annual.    Durga Hernandez DO  [facility]   "

## 2024-06-19 ENCOUNTER — HOSPITAL ENCOUNTER (OUTPATIENT)
Dept: CT IMAGING | Facility: HOSPITAL | Age: 54
Discharge: HOME | End: 2024-06-19
Admitting: FAMILY MEDICINE
Payer: COMMERCIAL

## 2024-06-19 DIAGNOSIS — Z87.891 PERSONAL HISTORY OF NICOTINE DEPENDENCE: ICD-10-CM

## 2024-06-19 PROCEDURE — 71271 CT THORAX LUNG CANCER SCR C-: CPT

## 2024-08-13 RX ORDER — ADAPALENE 0.1 G/100G
CREAM TOPICAL
Qty: 45 G | Refills: 5 | Status: SHIPPED | OUTPATIENT
Start: 2024-08-13

## 2024-09-04 RX ORDER — LEVOCETIRIZINE DIHYDROCHLORIDE 5 MG/1
TABLET, FILM COATED ORAL
Qty: 90 TABLET | Refills: 0 | Status: SHIPPED | OUTPATIENT
Start: 2024-09-04

## 2024-10-01 ENCOUNTER — OFFICE VISIT (OUTPATIENT)
Age: 54
End: 2024-10-01
Payer: COMMERCIAL

## 2024-10-01 VITALS
BODY MASS INDEX: 28.2 KG/M2 | WEIGHT: 197 LBS | SYSTOLIC BLOOD PRESSURE: 124 MMHG | OXYGEN SATURATION: 97 % | HEIGHT: 70 IN | HEART RATE: 85 BPM | DIASTOLIC BLOOD PRESSURE: 72 MMHG

## 2024-10-01 DIAGNOSIS — R79.89 LOW TESTOSTERONE IN MALE: Primary | ICD-10-CM

## 2024-10-01 DIAGNOSIS — D75.1 ERYTHROCYTOSIS: ICD-10-CM

## 2024-10-01 LAB — TESTOST SERPL-MCNC: 636 NG/DL (ref 193–740)

## 2024-10-01 PROCEDURE — 84403 ASSAY OF TOTAL TESTOSTERONE: CPT | Performed by: INTERNAL MEDICINE

## 2024-10-01 PROCEDURE — 85027 COMPLETE CBC AUTOMATED: CPT | Performed by: INTERNAL MEDICINE

## 2024-10-01 PROCEDURE — 99214 OFFICE O/P EST MOD 30 MIN: CPT | Performed by: INTERNAL MEDICINE

## 2024-10-01 NOTE — ASSESSMENT & PLAN NOTE
Clinicall improved. Feels poorly the last 2 weeks prior to blood draw so I will change frequencty from every 8 weeks to every 6 weeks

## 2024-10-01 NOTE — PROGRESS NOTES
"     Office Note      Date: 10/01/2024  Patient Name: Iam Blanco  MRN: [id number]  : 1970    Chief Complaint   Patient presents with    Low testosterone in male       History of Present Illness:   Iam Blanco is a 54 y.o. male who presents for Low testosterone in male  .   Current rx: testosterone 100 mg per week     Changes in history: none   Questions /problems:none- things have been going pretty good     Subjective          Review of Systems:   Review of Systems   Constitutional:         Energy level is fair   Genitourinary:  Positive for difficulty urinating.        Libido is normal  No E>D   Skin:         No acne       The following portions of the patient's history were reviewed and updated as appropriate: allergies, current medications, past family history, past medical history, past social history, past surgical history, and problem list.    Objective     Visit Vitals  /72 (BP Location: Right arm, Patient Position: Sitting, Cuff Size: Adult)   Pulse 85   Ht 177.8 cm (70\")   Wt 89.4 kg (197 lb)   SpO2 97%   BMI 28.27 kg/m²           Physical Exam:  Physical Exam  Vitals reviewed.   Constitutional:       Appearance: Normal appearance.   Neurological:      Mental Status: He is alert.   Psychiatric:         Mood and Affect: Mood normal.         Behavior: Behavior normal.         Thought Content: Thought content normal.         Judgment: Judgment normal.         Assessment / Plan      Assessment & Plan:  Problem List Items Addressed This Visit       Low testosterone in male - Primary    Overview     Has had 2 low testosterone levels  Started replacement in 2022         Current Assessment & Plan     Stable           Relevant Medications    Testosterone Cypionate (DEPOTESTOTERONE CYPIONATE) 200 MG/ML injection    Other Relevant Orders    Testosterone    Erythrocytosis    Overview     Due to testosterone therapy         Current Assessment & Plan     Clinicall improved. Feels poorly " the last 2 weeks prior to blood draw so I will change frequencty from every 8 weeks to every 6 weeks          Relevant Orders    CBC (No Diff)        Electronically signed by : Kenny Ryan MD   10/01/2024

## 2024-10-02 LAB
DEPRECATED RDW RBC AUTO: 38.8 FL (ref 37–54)
ERYTHROCYTE [DISTWIDTH] IN BLOOD BY AUTOMATED COUNT: 12.6 % (ref 12.3–15.4)
HCT VFR BLD AUTO: 51.3 % (ref 37.5–51)
HGB BLD-MCNC: 17.6 G/DL (ref 13–17.7)
MCH RBC QN AUTO: 29.4 PG (ref 26.6–33)
MCHC RBC AUTO-ENTMCNC: 34.3 G/DL (ref 31.5–35.7)
MCV RBC AUTO: 85.8 FL (ref 79–97)
PLATELET # BLD AUTO: 237 10*3/MM3 (ref 140–450)
PMV BLD AUTO: 13.4 FL (ref 6–12)
RBC # BLD AUTO: 5.98 10*6/MM3 (ref 4.14–5.8)
WBC NRBC COR # BLD AUTO: 7.85 10*3/MM3 (ref 3.4–10.8)

## 2024-12-02 RX ORDER — LEVOCETIRIZINE DIHYDROCHLORIDE 5 MG/1
TABLET, FILM COATED ORAL
Qty: 90 TABLET | Refills: 0 | Status: SHIPPED | OUTPATIENT
Start: 2024-12-02

## 2024-12-03 ENCOUNTER — OFFICE VISIT (OUTPATIENT)
Dept: FAMILY MEDICINE CLINIC | Facility: CLINIC | Age: 54
End: 2024-12-03
Payer: COMMERCIAL

## 2024-12-03 VITALS
SYSTOLIC BLOOD PRESSURE: 136 MMHG | BODY MASS INDEX: 29.35 KG/M2 | WEIGHT: 205 LBS | HEIGHT: 70 IN | OXYGEN SATURATION: 95 % | TEMPERATURE: 98.6 F | DIASTOLIC BLOOD PRESSURE: 80 MMHG | HEART RATE: 74 BPM | RESPIRATION RATE: 18 BRPM

## 2024-12-03 DIAGNOSIS — H92.22 OTORRHAGIA OF LEFT EAR: Primary | ICD-10-CM

## 2024-12-03 PROCEDURE — 99213 OFFICE O/P EST LOW 20 MIN: CPT | Performed by: STUDENT IN AN ORGANIZED HEALTH CARE EDUCATION/TRAINING PROGRAM

## 2024-12-03 RX ORDER — CIPROFLOXACIN/HYDROCORTISONE 0.2 %-1 %
3 SUSPENSION, DROPS(FINAL DOSAGE FORM)(ML) OTIC (EAR) 2 TIMES DAILY
Qty: 3 ML | Refills: 0 | Status: SHIPPED | OUTPATIENT
Start: 2024-12-03 | End: 2024-12-06 | Stop reason: ALTCHOICE

## 2024-12-03 NOTE — PROGRESS NOTES
"Chief Complaint  Earache (Bleeding from left ear)    History of Present Illness    The following portions of the patient's history were reviewed and updated as appropriate: allergies, current medications, past family history, past medical history, past social history, past surgical history, and problem list.    OBJECTIVE:  /80 (BP Location: Right arm, Patient Position: Sitting, Cuff Size: Adult)   Pulse 74   Temp 98.6 °F (37 °C) (Temporal)   Resp 18   Ht 177.8 cm (70\")   Wt 93 kg (205 lb)   SpO2 95%   BMI 29.41 kg/m²       Physical Exam   Left ear :  There is black dried blood at around the 7-9 o clock position of the ear canal with scant bright red blood . The black dried blood was removed with tweezers and cotton swab. The patient tolerated this without any issue. The ear canal has a skin colored nodule under this area that is not blistered red or draining any pus. The tm is intact and shows no signs of otitis media.             Assessment and Plan   Diagnoses and all orders for this visit:    1. Otorrhagia of left ear (Primary)  -     Cancel: Ambulatory Referral to ENT (Otolaryngology)  -     Ambulatory Referral to ENT (Otolaryngology)    Other orders  -     ciprofloxacin-hydrocortisone (Cipro HC) 0.2-1 % otic suspension; Administer 3 drops into the left ear 2 (Two) Times a Day for 7 days.  Dispense: 3 mL; Refill: 0      Refer to ent given bleeding nodule of ear canal without any foreign body seen.   Give antibiotics topically to protect ear canal from any infection given current bleeding.   I asked him to avoid putting anything in his ear and to seek care right away for any worsening bleeding fever or pain in the ear.       Return in about 1 week (around 12/10/2024).       Lena Baig D.O.  [facility]   "

## 2024-12-06 DIAGNOSIS — R79.89 LOW TESTOSTERONE IN MALE: ICD-10-CM

## 2024-12-06 RX ORDER — TESTOSTERONE CYPIONATE 200 MG/ML
INJECTION, SOLUTION INTRAMUSCULAR
Qty: 4 ML | Refills: 2 | Status: SHIPPED | OUTPATIENT
Start: 2024-12-06

## 2024-12-06 RX ORDER — NEOMYCIN SULFATE, POLYMYXIN B SULFATE, HYDROCORTISONE 3.5; 10000; 1 MG/ML; [USP'U]/ML; MG/ML
3 SOLUTION/ DROPS AURICULAR (OTIC) 4 TIMES DAILY
Qty: 10 ML | Refills: 0 | Status: SHIPPED | OUTPATIENT
Start: 2024-12-06

## 2024-12-06 NOTE — PROGRESS NOTES
I called the patient to check on him. He has had no further ear bleeding. No problems but insurance didn't cover otic solution. Called over alternative. He will return for any issues to see us sooner. Referral pending to ent.

## 2024-12-06 NOTE — TELEPHONE ENCOUNTER
Rx Refill Note  Requested Prescriptions     Pending Prescriptions Disp Refills    Testosterone Cypionate (DEPOTESTOTERONE CYPIONATE) 200 MG/ML injection [Pharmacy Med Name: TESTOSTERONE  MG/ML] 4 mL 2     Sig: INJECT 1ML WEEKLY INTO THE APPROPRIATE AREA AS DIRECTED, VIALS ARE SINGLE USE          Last office visit with prescribing clinician: 10/1/2024     Next office visit with prescribing clinician: 4/2/2025         Rosibel Monk MA  12/06/24, 09:25 EST

## 2024-12-18 DIAGNOSIS — R79.89 LOW TESTOSTERONE IN MALE: ICD-10-CM

## 2024-12-18 NOTE — TELEPHONE ENCOUNTER
Caller: Iam Blanco    Relationship: Self    Best call back number: [phone]     Requested Prescriptions:   Requested Prescriptions     Pending Prescriptions Disp Refills    Testosterone Cypionate (DEPOTESTOTERONE CYPIONATE) 200 MG/ML injection 4 mL 2     Sig: INJECT 1ML WEEKLY INTO THE APPROPRIATE AREA AS DIRECTED, VIALS ARE SINGLE USE        Pharmacy where request should be sent:    [facility]/pharmacy #[id number] - [place], [place] - [address] [facility] - [phone]  - [phone]    [address] [facility] [address]   Phone: [phone] Fax: [phone]   Hours: Not open 24 hours     Last office visit with prescribing clinician: 10/1/2024   Last telemedicine visit with prescribing clinician: Visit date not found   Next office visit with prescribing clinician: 4/2/2025     Additional details provided by patient: PT STATED THAT THE PHARMACY DID NOT RECEIVE PRESCRIPTION. THIS IS SHOEING IN PT CHART THAT IT WAS SENT OVER. PLEASE ADVISE    Does the patient have less than a 3 day supply:  [x] Yes  [] No    Would you like a call back once the refill request has been completed: [x] Yes [] No    If the office needs to give you a call back, can they leave a voicemail: [x] Yes [] No    Nasir Narayanan   12/18/24 10:43 EST

## 2024-12-20 RX ORDER — TESTOSTERONE CYPIONATE 200 MG/ML
INJECTION, SOLUTION INTRAMUSCULAR
Qty: 4 ML | Refills: 2 | OUTPATIENT
Start: 2024-12-20

## 2024-12-30 ENCOUNTER — LAB (OUTPATIENT)
Dept: LAB | Facility: HOSPITAL | Age: 54
End: 2024-12-30
Payer: COMMERCIAL

## 2024-12-30 ENCOUNTER — CONSULT (OUTPATIENT)
Dept: ONCOLOGY | Facility: CLINIC | Age: 54
End: 2024-12-30
Payer: COMMERCIAL

## 2024-12-30 VITALS
OXYGEN SATURATION: 96 % | RESPIRATION RATE: 18 BRPM | HEART RATE: 88 BPM | TEMPERATURE: 97.5 F | SYSTOLIC BLOOD PRESSURE: 141 MMHG | BODY MASS INDEX: 29.49 KG/M2 | HEIGHT: 70 IN | WEIGHT: 206 LBS | DIASTOLIC BLOOD PRESSURE: 74 MMHG

## 2024-12-30 DIAGNOSIS — D75.1 ERYTHROCYTOSIS: ICD-10-CM

## 2024-12-30 DIAGNOSIS — R79.89 LOW TESTOSTERONE IN MALE: ICD-10-CM

## 2024-12-30 DIAGNOSIS — D75.1 ERYTHROCYTOSIS: Primary | ICD-10-CM

## 2024-12-30 LAB
BASOPHILS # BLD AUTO: 0.07 10*3/MM3 (ref 0–0.2)
BASOPHILS NFR BLD AUTO: 0.7 % (ref 0–1.5)
DEPRECATED RDW RBC AUTO: 37 FL (ref 37–54)
EOSINOPHIL # BLD AUTO: 0.17 10*3/MM3 (ref 0–0.4)
EOSINOPHIL NFR BLD AUTO: 1.8 % (ref 0.3–6.2)
ERYTHROCYTE [DISTWIDTH] IN BLOOD BY AUTOMATED COUNT: 12.3 % (ref 12.3–15.4)
HCT VFR BLD AUTO: 50.8 % (ref 37.5–51)
HGB BLD-MCNC: 16.9 G/DL (ref 13–17.7)
IMM GRANULOCYTES # BLD AUTO: 0.01 10*3/MM3 (ref 0–0.05)
IMM GRANULOCYTES NFR BLD AUTO: 0.1 % (ref 0–0.5)
LYMPHOCYTES # BLD AUTO: 3.12 10*3/MM3 (ref 0.7–3.1)
LYMPHOCYTES NFR BLD AUTO: 32.9 % (ref 19.6–45.3)
MCH RBC QN AUTO: 27.1 PG (ref 26.6–33)
MCHC RBC AUTO-ENTMCNC: 33.3 G/DL (ref 31.5–35.7)
MCV RBC AUTO: 81.5 FL (ref 79–97)
MONOCYTES # BLD AUTO: 1.25 10*3/MM3 (ref 0.1–0.9)
MONOCYTES NFR BLD AUTO: 13.2 % (ref 5–12)
NEUTROPHILS NFR BLD AUTO: 4.86 10*3/MM3 (ref 1.7–7)
NEUTROPHILS NFR BLD AUTO: 51.3 % (ref 42.7–76)
PLATELET # BLD AUTO: 238 10*3/MM3 (ref 140–450)
PMV BLD AUTO: 10.8 FL (ref 6–12)
RBC # BLD AUTO: 6.23 10*6/MM3 (ref 4.14–5.8)
WBC NRBC COR # BLD AUTO: 9.48 10*3/MM3 (ref 3.4–10.8)

## 2024-12-30 PROCEDURE — 36415 COLL VENOUS BLD VENIPUNCTURE: CPT

## 2024-12-30 PROCEDURE — 99204 OFFICE O/P NEW MOD 45 MIN: CPT | Performed by: INTERNAL MEDICINE

## 2024-12-30 PROCEDURE — 82668 ASSAY OF ERYTHROPOIETIN: CPT

## 2024-12-30 PROCEDURE — 85025 COMPLETE CBC W/AUTO DIFF WBC: CPT

## 2024-12-30 RX ORDER — TESTOSTERONE CYPIONATE 200 MG/ML
INJECTION, SOLUTION INTRAMUSCULAR
Qty: 4 ML | Refills: 2 | Status: SHIPPED | OUTPATIENT
Start: 2024-12-30

## 2024-12-30 NOTE — TELEPHONE ENCOUNTER
Rx Refill Note  Requested Prescriptions      No prescriptions requested or ordered in this encounter      Last office visit with prescribing clinician: 10/1/2024     Next office visit with prescribing clinician: 4/2/2025

## 2024-12-30 NOTE — LETTER
December 30, 2024     Durga Hernandez DO  [facility] [address] [address]    Patient: Iam Blanco   YOB: 1970   Date of Visit: 12/30/2024     Dear Durga Hernandez DO:       Thank you for referring Iam Blanco to me for evaluation. Below are the relevant portions of my assessment and plan of care.    If you have questions, please do not hesitate to call me. I look forward to following Iam along with you.         Sincerely,        Jose Daniel Palma MD        CC: MD Minerva Diehl Lee G, MD  12/30/24 9125  Sign when Signing Visit  CHIEF COMPLAINT: Decreased libido with decreased T on testosterone supplementation with sleep apnea    REASON FOR REFERRAL: Erythrocytosis      RECORDS OBTAINED  Records of the patients history including those obtained from primary care were reviewed and summarized in detail.    Oncology/Hematology History Overview Note   1.  Erythrocytosis on testosterone for low T per Dr. Ryan and sleep apnea  2.  Reflux  3.  Proctitis  4.  Hypertension  5.  Ulcerative colitis    -10/6/2021 testosterone low 189.  Hematocrit 48.9%  -3/26/2024 hematocrit 56.6%  -6/19/2024 CT chest low-dose lung cancer screening lung RADS 1.  Partial evaluation of upper abdomen unremarkable  -9/26/2023 hematocrit 56.5%  -10/1/2024 testosterone normal 636.  Hematocrit 51.3% upper limit of normal 51%    -12/30/2024 [facility] consult: Patient with mild elevation of hematocrit on testosterone for low T as well as sleep apnea.  Will check JAK2 with reflex testing along with erythropoietin though extremely unlikely myeloproliferative with no splenomegaly on exam or on prior imaging and no erythromelalgia, early satiety, pruritus, claudication, etc.  Assuming these are negative and assuming his workup with pulmonary medicine is negative and his sleep apnea is adequately treated, then periodic blood donations at the blood center to help mitigate the testosterone-induced  erythrocytosis should be adequate.  He will see my nurse practitioner back to go over the results of these molecular tests and if they are negative she will make sure he follows up with pulmonary medicine and assuming they approve therapeutic phlebotomy relative to the pulmonary side of his secondary erythrocytosis and they deem it to be purely due to his testosterone, then he can start blood donations by direct every 2-month donation at the [facility] blood [place] for which she should not necessarily need a prescription and we will check his hematocrit 6 months from his follow-up with my nurse practitioner to see where he stands.  If his erythropoietin level is surprisingly quite elevated, then further imaging relative to renal cell or kidney cancer may be in order but that is exceedingly unlikely to be the case.           HISTORY OF PRESENT ILLNESS:  The patient is a 54 y.o.  male, referred for erythrocytosis in the face of sleep apnea and testosterone supplementation    REVIEW OF SYSTEMS:  No erythromelalgia, early satiety, claudication, pruritus    Past Medical History:   Diagnosis Date   • Anxiety    • H/O esophageal reflux    • Hypertension    • Influenza    • Testosterone deficiency October 2021   • Tobacco abuse    • Toe pain      Past Surgical History:   Procedure Laterality Date   • HERNIA REPAIR     • WISDOM TOOTH EXTRACTION         Current Outpatient Medications on File Prior to Visit   Medication Sig Dispense Refill   • adapalene (DIFFERIN) 0.1 % cream APPLY TOPICALLY TO THE APPROPRIATE AREA AS DIRECTED EVERY NIGHT. 45 g 5   • fluticasone (FLONASE) 50 MCG/ACT nasal spray 2 sprays into the nostril(s) as directed by provider Daily. 48 mL 3   • levocetirizine (XYZAL) 5 MG tablet TAKE 1 TABLET BY MOUTH EVERY DAY IN THE EVENING 90 tablet 0   • losartan (COZAAR) 50 MG tablet Take 1 tablet by mouth Daily. 90 tablet 3   • mesalamine (LIALDA) 1.2 g EC tablet Take 2 tablets by mouth Daily.     • Needle,  "Disp, (Hypodermic Needle) 18G X 1\" misc Use every week to drawn up testosterone 20 each 3   • neomycin-polymyxin-hydrocortisone (CORTISPORIN) 3.5-84428-9 otic solution Administer 3 drops into the left ear 4 (Four) Times a Day. 10 mL 0   • Syringe 21G X 1-\" 3 ML misc USE TO INJECT TESTOSTERONE EVERY WEEK 20 each 3   • [DISCONTINUED] Testosterone Cypionate (DEPOTESTOTERONE CYPIONATE) 200 MG/ML injection INJECT 1ML WEEKLY INTO THE APPROPRIATE AREA AS DIRECTED, VIALS ARE SINGLE USE 4 mL 2     No current facility-administered medications on file prior to visit.       Allergies   Allergen Reactions   • Sulfa Antibiotics        Social History     Socioeconomic History   • Marital status:    Tobacco Use   • Smoking status: Former     Current packs/day: 0.00     Average packs/day: 1 pack/day for 24.0 years (24.0 ttl pk-yrs)     Types: Cigarettes     Start date: 1990     Quit date: 2014     Years since quittin.0     Passive exposure: Past   • Smokeless tobacco: Former     Types: Snuff     Quit date:    Vaping Use   • Vaping status: Never Used   Substance and Sexual Activity   • Alcohol use: No   • Drug use: No   • Sexual activity: Yes     Partners: Female     Birth control/protection: None       Family History   Problem Relation Age of Onset   • Sleep apnea Mother    • Diabetes Mother    • Heart disease Father    • Hypertension Father    • Cancer Sister         Skin cancer   • Diabetes Maternal Grandfather        PHYSICAL EXAM:  No plethora jaundice icterus.  No palpable hepatosplenomegaly.    /74   Pulse 88   Temp 97.5 °F (36.4 °C)   Resp 18   Ht 177.8 cm (70\")   Wt 93.4 kg (206 lb)   SpO2 96%   BMI 29.56 kg/m²     ECOG score: 0           ECOG: (0) Fully Active - Able to Carry On All Pre-disease Performance Without Restriction    Lab Results   Component Value Date    HGB 17.6 10/01/2024    HCT 51.3 (H) 10/01/2024    MCV 85.8 10/01/2024     10/01/2024    WBC 7.85 10/01/2024    " NEUTROABS 4.83 08/31/2017    LYMPHSABS 3.66 08/31/2017    MONOSABS 0.83 08/31/2017    EOSABS 0.26 08/31/2017    BASOSABS 0.04 08/31/2017     Lab Results   Component Value Date    GLUCOSE 107 (H) 06/07/2024    BUN 16 06/07/2024    CREATININE 1.10 06/07/2024     06/07/2024    K 4.2 06/07/2024     06/07/2024    CO2 23.6 06/07/2024    CALCIUM 9.7 06/07/2024    PROTEINTOT 7.2 06/07/2024    ALBUMIN 4.6 06/07/2024    BILITOT 0.6 06/07/2024    ALKPHOS 50 06/07/2024    AST 25 06/07/2024    ALT 43 (H) 06/07/2024         Assessment & Plan  1.  Erythrocytosis on testosterone for low T per Dr. Ryan and sleep apnea  2.  Reflux  3.  Proctitis  4.  Hypertension  5.  Ulcerative colitis    -10/6/2021 testosterone low 189.  Hematocrit 48.9%  -3/26/2024 hematocrit 56.6%  -6/19/2024 CT chest low-dose lung cancer screening lung RADS 1.  Partial evaluation of upper abdomen unremarkable  -9/26/2023 hematocrit 56.5%  -10/1/2024 testosterone normal 636.  Hematocrit 51.3% upper limit of normal 51%    -12/30/2024 [facility]ology consult: Patient with mild elevation of hematocrit on testosterone for low T as well as sleep apnea.  Will check JAK2 with reflex testing along with erythropoietin though extremely unlikely myeloproliferative with no splenomegaly on exam or on prior imaging and no erythromelalgia, early satiety, pruritus, claudication, etc.  Assuming these are negative and assuming his workup with pulmonary medicine is negative and his sleep apnea is adequately treated, then periodic blood donations at the blood center to help mitigate the testosterone-induced erythrocytosis should be adequate.  He will see my nurse practitioner back to go over the results of these molecular tests and if they are negative she will make sure he follows up with pulmonary medicine and assuming they approve therapeutic phlebotomy relative to the pulmonary side of his secondary erythrocytosis and they deem it to be purely due to his  testosterone, then he can start blood donations by direct every 2-month donation at the [facility] blood [place] for which she should not necessarily need a prescription and we will check his hematocrit 6 months from his follow-up with my nurse practitioner to see where he stands.  If his erythropoietin level is surprisingly quite elevated, then further imaging relative to renal cell or kidney cancer may be in order but that is exceedingly unlikely to be the case.  He has already been getting phlebotomies periodically under the guidance of Dr. Ryan and ostensibly that is perfectly fine and I will leave to his capable hands but I did ask him to check with pulmonary colleagues when he gets his sleep apnea follow-up in February to make sure that he does not have secondary cytosis due to undertreated sleep apnea as I would not want him to be as aggressively phlebotomized.  For secondary erythrocytosis keeping him around 51 to 52% is adequate.  If I am wrong and he has polycythemia rubra vera based on JAK2 with reflex testing, then our goal is a hematocrit of 45%.  My nurse practitioner will go over his results and make sure all this is moving forward according to the above plan    Time of care today inclusive of time spent today prior to his arrival reviewing past records and endocrinology notes and pulmonology sleep medicine notes and during visit interviewing patient as to signs or symptoms of the potential causes and consequences of erythrocytosis and the management thereof and after visit instituting this plan took 46 minutes patient care time throughout the day today.        Jose Daniel Palma MD    12/30/2024

## 2024-12-30 NOTE — PROGRESS NOTES
CHIEF COMPLAINT: Decreased libido with decreased T on testosterone supplementation with sleep apnea    REASON FOR REFERRAL: Erythrocytosis      RECORDS OBTAINED  Records of the patients history including those obtained from primary care were reviewed and summarized in detail.    Oncology/Hematology History Overview Note   1.  Erythrocytosis on testosterone for low T per Dr. Ryan and sleep apnea  2.  Reflux  3.  Proctitis  4.  Hypertension  5.  Ulcerative colitis    -10/6/2021 testosterone low 189.  Hematocrit 48.9%  -3/26/2024 hematocrit 56.6%  -6/19/2024 CT chest low-dose lung cancer screening lung RADS 1.  Partial evaluation of upper abdomen unremarkable  -9/26/2023 hematocrit 56.5%  -10/1/2024 testosterone normal 636.  Hematocrit 51.3% upper limit of normal 51%    -12/30/2024 [facility] otology consult: Patient with mild elevation of hematocrit on testosterone for low T as well as sleep apnea.  Will check JAK2 with reflex testing along with erythropoietin though extremely unlikely myeloproliferative with no splenomegaly on exam or on prior imaging and no erythromelalgia, early satiety, pruritus, claudication, etc.  Assuming these are negative and assuming his workup with pulmonary medicine is negative and his sleep apnea is adequately treated, then periodic blood donations at the blood center to help mitigate the testosterone-induced erythrocytosis should be adequate.  He will see my nurse practitioner back to go over the results of these molecular tests and if they are negative she will make sure he follows up with pulmonary medicine and assuming they approve therapeutic phlebotomy relative to the pulmonary side of his secondary erythrocytosis and they deem it to be purely due to his testosterone, then he can start blood donations by direct every 2-month donation at the [facility] blood [place] for which she should not necessarily need a prescription and we will check his hematocrit 6 months from his  "follow-up with my nurse practitioner to see where he stands.  If his erythropoietin level is surprisingly quite elevated, then further imaging relative to renal cell or kidney cancer may be in order but that is exceedingly unlikely to be the case.           HISTORY OF PRESENT ILLNESS:  The patient is a 54 y.o.  male, referred for erythrocytosis in the face of sleep apnea and testosterone supplementation    REVIEW OF SYSTEMS:  No erythromelalgia, early satiety, claudication, pruritus    Past Medical History:   Diagnosis Date    Anxiety     H/O esophageal reflux     Hypertension     Influenza     Testosterone deficiency October 2021    Tobacco abuse     Toe pain      Past Surgical History:   Procedure Laterality Date    HERNIA REPAIR      WISDOM TOOTH EXTRACTION         Current Outpatient Medications on File Prior to Visit   Medication Sig Dispense Refill    adapalene (DIFFERIN) 0.1 % cream APPLY TOPICALLY TO THE APPROPRIATE AREA AS DIRECTED EVERY NIGHT. 45 g 5    fluticasone (FLONASE) 50 MCG/ACT nasal spray 2 sprays into the nostril(s) as directed by provider Daily. 48 mL 3    levocetirizine (XYZAL) 5 MG tablet TAKE 1 TABLET BY MOUTH EVERY DAY IN THE EVENING 90 tablet 0    losartan (COZAAR) 50 MG tablet Take 1 tablet by mouth Daily. 90 tablet 3    mesalamine (LIALDA) 1.2 g EC tablet Take 2 tablets by mouth Daily.      Needle, Disp, (Hypodermic Needle) 18G X 1\" misc Use every week to drawn up testosterone 20 each 3    neomycin-polymyxin-hydrocortisone (CORTISPORIN) 3.5-56615-1 otic solution Administer 3 drops into the left ear 4 (Four) Times a Day. 10 mL 0    Syringe 21G X 1-1/4\" 3 ML misc USE TO INJECT TESTOSTERONE EVERY WEEK 20 each 3    [DISCONTINUED] Testosterone Cypionate (DEPOTESTOTERONE CYPIONATE) 200 MG/ML injection INJECT 1ML WEEKLY INTO THE APPROPRIATE AREA AS DIRECTED, VIALS ARE SINGLE USE 4 mL 2     No current facility-administered medications on file prior to visit.       Allergies   Allergen Reactions    " "Sulfa Antibiotics        Social History     Socioeconomic History    Marital status:    Tobacco Use    Smoking status: Former     Current packs/day: 0.00     Average packs/day: 1 pack/day for 24.0 years (24.0 ttl pk-yrs)     Types: Cigarettes     Start date: 1990     Quit date: 2014     Years since quittin.0     Passive exposure: Past    Smokeless tobacco: Former     Types: Snuff     Quit date:    Vaping Use    Vaping status: Never Used   Substance and Sexual Activity    Alcohol use: No    Drug use: No    Sexual activity: Yes     Partners: Female     Birth control/protection: None       Family History   Problem Relation Age of Onset    Sleep apnea Mother     Diabetes Mother     Heart disease Father     Hypertension Father     Cancer Sister         Skin cancer    Diabetes Maternal Grandfather        PHYSICAL EXAM:  No plethora jaundice icterus.  No palpable hepatosplenomegaly.    /74   Pulse 88   Temp 97.5 °F (36.4 °C)   Resp 18   Ht 177.8 cm (70\")   Wt 93.4 kg (206 lb)   SpO2 96%   BMI 29.56 kg/m²     ECOG score: 0           ECOG: (0) Fully Active - Able to Carry On All Pre-disease Performance Without Restriction    Lab Results   Component Value Date    HGB 17.6 10/01/2024    HCT 51.3 (H) 10/01/2024    MCV 85.8 10/01/2024     10/01/2024    WBC 7.85 10/01/2024    NEUTROABS 4.83 2017    LYMPHSABS 3.66 2017    MONOSABS 0.83 2017    EOSABS 0.26 2017    BASOSABS 0.04 2017     Lab Results   Component Value Date    GLUCOSE 107 (H) 2024    BUN 16 2024    CREATININE 1.10 2024     2024    K 4.2 2024     2024    CO2 23.6 2024    CALCIUM 9.7 2024    PROTEINTOT 7.2 2024    ALBUMIN 4.6 2024    BILITOT 0.6 2024    ALKPHOS 50 2024    AST 25 2024    ALT 43 (H) 2024         Assessment & Plan   1.  Erythrocytosis on testosterone for low T per Dr. Ryan and sleep " apnea  2.  Reflux  3.  Proctitis  4.  Hypertension  5.  Ulcerative colitis    -10/6/2021 testosterone low 189.  Hematocrit 48.9%  -3/26/2024 hematocrit 56.6%  -6/19/2024 CT chest low-dose lung cancer screening lung RADS 1.  Partial evaluation of upper abdomen unremarkable  -9/26/2023 hematocrit 56.5%  -10/1/2024 testosterone normal 636.  Hematocrit 51.3% upper limit of normal 51%    -12/30/2024 [facility] otology consult: Patient with mild elevation of hematocrit on testosterone for low T as well as sleep apnea.  Will check JAK2 with reflex testing along with erythropoietin though extremely unlikely myeloproliferative with no splenomegaly on exam or on prior imaging and no erythromelalgia, early satiety, pruritus, claudication, etc.  Assuming these are negative and assuming his workup with pulmonary medicine is negative and his sleep apnea is adequately treated, then periodic blood donations at the blood center to help mitigate the testosterone-induced erythrocytosis should be adequate.  He will see my nurse practitioner back to go over the results of these molecular tests and if they are negative she will make sure he follows up with pulmonary medicine and assuming they approve therapeutic phlebotomy relative to the pulmonary side of his secondary erythrocytosis and they deem it to be purely due to his testosterone, then he can start blood donations by direct every 2-month donation at the [facility] blood center for which she should not necessarily need a prescription and we will check his hematocrit 6 months from his follow-up with my nurse practitioner to see where he stands.  If his erythropoietin level is surprisingly quite elevated, then further imaging relative to renal cell or kidney cancer may be in order but that is exceedingly unlikely to be the case.  He has already been getting phlebotomies periodically under the guidance of Dr. Ryan and ostensibly that is perfectly fine and I will leave to his  capable hands but I did ask him to check with pulmonary colleagues when he gets his sleep apnea follow-up in February to make sure that he does not have secondary cytosis due to undertreated sleep apnea as I would not want him to be as aggressively phlebotomized.  For secondary erythrocytosis keeping him around 51 to 52% is adequate.  If I am wrong and he has polycythemia rubra vera based on JAK2 with reflex testing, then our goal is a hematocrit of 45%.  My nurse practitioner will go over his results and make sure all this is moving forward according to the above plan    Time of care today inclusive of time spent today prior to his arrival reviewing past records and endocrinology notes and pulmonology sleep medicine notes and during visit interviewing patient as to signs or symptoms of the potential causes and consequences of erythrocytosis and the management thereof and after visit instituting this plan took 46 minutes patient care time throughout the day today.        Jose Daniel Palma MD    12/30/2024

## 2025-01-02 LAB — EPO SERPL-ACNC: 16.8 MIU/ML (ref 2.6–18.5)

## 2025-01-06 LAB
CALR EXON 9 MUT ANL BLD/T: NORMAL
CITATION REF LAB TEST: NORMAL
JAK2 GENE MUT ANL BLD/T: NORMAL
JAK2 P.V617F BLD/T QL: NORMAL
LAB DIRECTOR NAME PROVIDER: NORMAL
MPL GENE MUT TESTED MAR: NORMAL
REF LAB TEST METHOD: NORMAL
REFLEX: NORMAL
SPECIMEN TYPE: NORMAL
TEST PERFORMANCE INFO SPEC: NORMAL

## 2025-01-20 ENCOUNTER — OFFICE VISIT (OUTPATIENT)
Dept: ONCOLOGY | Facility: CLINIC | Age: 55
End: 2025-01-20
Payer: COMMERCIAL

## 2025-01-20 VITALS
HEIGHT: 70 IN | SYSTOLIC BLOOD PRESSURE: 148 MMHG | RESPIRATION RATE: 16 BRPM | OXYGEN SATURATION: 95 % | HEART RATE: 88 BPM | DIASTOLIC BLOOD PRESSURE: 72 MMHG | BODY MASS INDEX: 29.78 KG/M2 | WEIGHT: 208 LBS | TEMPERATURE: 97.7 F

## 2025-01-20 DIAGNOSIS — D75.1 ERYTHROCYTOSIS: Primary | ICD-10-CM

## 2025-01-20 PROCEDURE — 99213 OFFICE O/P EST LOW 20 MIN: CPT | Performed by: NURSE PRACTITIONER

## 2025-01-20 NOTE — PROGRESS NOTES
CHIEF COMPLAINT: Erythrocytosis    Problem List:  Oncology/Hematology History Overview Note   1.  Erythrocytosis on testosterone for low T per Dr. Ryan and sleep apnea  2.  Reflux  3.  Proctitis  4.  Hypertension  5.  Ulcerative colitis     -10/6/2021 testosterone low 189.  Hematocrit 48.9%  -3/26/2024 hematocrit 56.6%  -6/19/2024 CT chest low-dose lung cancer screening lung RADS 1.  Partial evaluation of upper abdomen unremarkable  -9/26/2023 hematocrit 56.5%  -10/1/2024 testosterone normal 636.  Hematocrit 51.3% upper limit of normal 51%     -12/30/2024 [facility] otology consult: Patient with mild elevation of hematocrit on testosterone for low T as well as sleep apnea.  Will check JAK2 with reflex testing along with erythropoietin though extremely unlikely myeloproliferative with no splenomegaly on exam or on prior imaging and no erythromelalgia, early satiety, pruritus, claudication, etc.  Assuming these are negative and assuming his workup with pulmonary medicine is negative and his sleep apnea is adequately treated, then periodic blood donations at the blood center to help mitigate the testosterone-induced erythrocytosis should be adequate.  He will see my nurse practitioner back to go over the results of these molecular tests and if they are negative she will make sure he follows up with pulmonary medicine and assuming they approve therapeutic phlebotomy relative to the pulmonary side of his secondary erythrocytosis and they deem it to be purely due to his testosterone, then he can start blood donations by direct every 2-month donation at the [facility] blood [place] for which she should not necessarily need a prescription and we will check his hematocrit 6 months from his follow-up with my nurse practitioner to see where he stands.  If his erythropoietin level is surprisingly quite elevated, then further imaging relative to renal cell or kidney cancer may be in order but that is exceedingly unlikely  "to be the case.  He has already been getting phlebotomies periodically under the guidance of Dr. Ryan and ostensibly that is perfectly fine and I will leave to his capable hands but I did ask him to check with pulmonary colleagues when he gets his sleep apnea follow-up in February to make sure that he does not have secondary cytosis due to undertreated sleep apnea as I would not want him to be as aggressively phlebotomized.  For secondary erythrocytosis keeping him around 51 to 52% is adequate.  If I am wrong and he has polycythemia rubra vera based on JAK2 with reflex testing, then our goal is a hematocrit of 45%.  My nurse practitioner will go over his results and make sure all this is moving forward according to the above plan           HISTORY OF PRESENT ILLNESS:  The patient is a 54 y.o. male, here for follow up on management of erythrocytosis.  Iam has been doing well since we saw him last with no new concerns.  He states that he has been donating blood every 2 weeks or so.  He has had no change in his health or concerns since we saw him last.  He has an appointment for evaluation with pulmonology early February.    Past Medical History:   Diagnosis Date    Anxiety     H/O esophageal reflux     Hypertension     Influenza     Testosterone deficiency October 2021    Tobacco abuse     Toe pain      Past Surgical History:   Procedure Laterality Date    HERNIA REPAIR      WISDOM TOOTH EXTRACTION         Allergies   Allergen Reactions    Sulfa Antibiotics        Family History and Social History reviewed and changed as necessary    REVIEW OF SYSTEM:   No new somatic concerns    PHYSICAL EXAM:  Well-developed, well-nourished healthy appearing male in no distress  Respirations regular and unlabored    Vitals:    01/20/25 1420   BP: 148/72   Pulse: 88   Resp: 16   Temp: 97.7 °F (36.5 °C)   SpO2: 95%   Weight: 94.3 kg (208 lb)   Height: 177.8 cm (70\")     Vitals:    01/20/25 1420   PainSc: 0-No pain          ECOG " score: 0           Vitals reviewed.  Labs reviewed    ECOG: (0) Fully Active - Able to Carry On All Pre-disease Performance Without Restriction    Lab Results   Component Value Date    HGB 16.9 12/30/2024    HCT 50.8 12/30/2024    MCV 81.5 12/30/2024     12/30/2024    WBC 9.48 12/30/2024    NEUTROABS 4.86 12/30/2024    LYMPHSABS 3.12 (H) 12/30/2024    MONOSABS 1.25 (H) 12/30/2024    EOSABS 0.17 12/30/2024    BASOSABS 0.07 12/30/2024       Lab Results   Component Value Date    GLUCOSE 107 (H) 06/07/2024    BUN 16 06/07/2024    CREATININE 1.10 06/07/2024     06/07/2024    K 4.2 06/07/2024     06/07/2024    CO2 23.6 06/07/2024    CALCIUM 9.7 06/07/2024    PROTEINTOT 7.2 06/07/2024    ALBUMIN 4.6 06/07/2024    BILITOT 0.6 06/07/2024    ALKPHOS 50 06/07/2024    AST 25 06/07/2024    ALT 43 (H) 06/07/2024             ASSESSMENT & PLAN:    1.  Erythrocytosis on testosterone for low T per Dr. Ryan and sleep apnea  2.  Reflux  3.  Proctitis  4.  Hypertension  5.  Ulcerative colitis     -10/6/2021 testosterone low 189.  Hematocrit 48.9%  -3/26/2024 hematocrit 56.6%  -6/19/2024 CT chest low-dose lung cancer screening lung RADS 1.  Partial evaluation of upper abdomen unremarkable  -9/26/2023 hematocrit 56.5%  -10/1/2024 testosterone normal 636.  Hematocrit 51.3% upper limit of normal 51%     -12/30/2024 [employer] otology consult: Patient with mild elevation of hematocrit on testosterone for low T as well as sleep apnea.  Will check JAK2 with reflex testing along with erythropoietin though extremely unlikely myeloproliferative with no splenomegaly on exam or on prior imaging and no erythromelalgia, early satiety, pruritus, claudication, etc.  Assuming these are negative and assuming his workup with pulmonary medicine is negative and his sleep apnea is adequately treated, then periodic blood donations at the blood center to help mitigate the testosterone-induced erythrocytosis should be adequate.  He will see my  nurse practitioner back to go over the results of these molecular tests and if they are negative she will make sure he follows up with pulmonary medicine and assuming they approve therapeutic phlebotomy relative to the pulmonary side of his secondary erythrocytosis and they deem it to be purely due to his testosterone, then he can start blood donations by direct every 2-month donation at the [facility] blood [place] for which she should not necessarily need a prescription and we will check his hematocrit 6 months from his follow-up with my nurse practitioner to see where he stands.  If his erythropoietin level is surprisingly quite elevated, then further imaging relative to renal cell or kidney cancer may be in order but that is exceedingly unlikely to be the case.  He has already been getting phlebotomies periodically under the guidance of Dr. Ryan and ostensibly that is perfectly fine and I will leave to his capable hands but I did ask him to check with pulmonary colleagues when he gets his sleep apnea follow-up in February to make sure that he does not have secondary cytosis due to undertreated sleep apnea as I would not want him to be as aggressively phlebotomized.  For secondary erythrocytosis keeping him around 51 to 52% is adequate.  If I am wrong and he has polycythemia rubra vera based on JAK2 with reflex testing, then our goal is a hematocrit of 45%.  My nurse practitioner will go over his results and make sure all this is moving forward according to the above plan.    -12/30/2024 labs CBC WBC 9.48, hemoglobin 16.9, hematocrit 50.8%, platelet count 238,000.  Erythropoietin normal at 16.8.  JAK2 [id number] with reflex to CALR + MPL + E12-E15 negative.  -1/20/2025 [facility] hematology clinic follow-up: Workup for myeloproliferative disorder was negative.  Erythropoietin level is normal.  His hematocrit on 1230 was 50.8%.  He has an appointment for evaluation with pulmonary medicine early February.  He is  currently doing phlebotomy every few weeks or so through the blood center and will continue this, hematocrit around 51-52% his adequate for secondary erythrocytosis.  We will repeat his CBC in 6 months for follow-up and if still in acceptable range we will then discontinue routine hematological follow-up.    I spent 20 minutes caring for Iam on this date of service. This time includes time spent by me in the following activities: preparing for the visit, reviewing tests, obtaining and/or reviewing a separately obtained history, performing a medically appropriate examination and/or evaluation, ordering medications, tests, or procedures, documenting information in the medical record, and independently interpreting results and communicating that information with the patient/family/caregiver.     Akosua Sen, APRN    01/20/2025

## 2025-01-28 ENCOUNTER — PRIOR AUTHORIZATION (OUTPATIENT)
Dept: ENDOCRINOLOGY | Facility: CLINIC | Age: 55
End: 2025-01-28
Payer: COMMERCIAL

## 2025-01-28 NOTE — TELEPHONE ENCOUNTER
VIKKI ANDRADE (Key: [id number])  PA Case ID #: [id number]  Rx #: [id number]  Need Help? Call us at [phone]  Outcome  [facility]  Your PA request has been approved. Additional information will be provided in the approval communication. (Message 1144)  Authorization Expiration Date: 1/28/2028  Drug  Testosterone Cypionate 200MG/ML intramuscular solution  ePA cloud logo  Form  [employer] PA Form (2017 [employer])

## 2025-02-03 ENCOUNTER — OFFICE VISIT (OUTPATIENT)
Dept: FAMILY MEDICINE CLINIC | Facility: CLINIC | Age: 55
End: 2025-02-03
Payer: COMMERCIAL

## 2025-02-03 VITALS
WEIGHT: 210 LBS | OXYGEN SATURATION: 97 % | DIASTOLIC BLOOD PRESSURE: 74 MMHG | SYSTOLIC BLOOD PRESSURE: 130 MMHG | HEIGHT: 70 IN | TEMPERATURE: 97.3 F | HEART RATE: 88 BPM | BODY MASS INDEX: 30.06 KG/M2

## 2025-02-03 DIAGNOSIS — Z20.828 EXPOSURE TO THE FLU: Primary | ICD-10-CM

## 2025-02-03 LAB
EXPIRATION DATE: NORMAL
FLUAV AG NPH QL: NEGATIVE
FLUBV AG NPH QL: NEGATIVE
INTERNAL CONTROL: NORMAL
Lab: NORMAL

## 2025-02-03 PROCEDURE — 99213 OFFICE O/P EST LOW 20 MIN: CPT | Performed by: FAMILY MEDICINE

## 2025-02-03 PROCEDURE — 87804 INFLUENZA ASSAY W/OPTIC: CPT | Performed by: FAMILY MEDICINE

## 2025-02-03 RX ORDER — OSELTAMIVIR PHOSPHATE 75 MG/1
75 CAPSULE ORAL DAILY
Qty: 10 CAPSULE | Refills: 0 | Status: SHIPPED | OUTPATIENT
Start: 2025-02-03 | End: 2025-02-13

## 2025-02-03 NOTE — PROGRESS NOTES
"    Follow Up Office Visit      Date: 2025   Patient Name: Iam Blanco  : 1970   MRN: [id number]     Chief Complaint:    Chief Complaint   Patient presents with    Cough     Daughter has flu a        History of Present Illness: Iam Blanco is a 54 y.o. male who presents today for eval of fever and reports was exposed to flu.    Sees Dr. Hernandez for pcp.    Daughter with Flu A.  Reports she was dx on Saturday but felt bad on Friday.    Patient has had low grade fever, and some cough.  Reports fatigue as well.      Subjective      Review of Systems:   Review of Systems   Constitutional:  Positive for fatigue and fever (low grade).        Ache   Respiratory:  Positive for cough.    Gastrointestinal:  Negative for diarrhea, nausea and vomiting.       I have reviewed the patients family history, social history, past medical history, past surgical history and have updated it as appropriate.     Medications:     Current Outpatient Medications:     adapalene (DIFFERIN) 0.1 % cream, APPLY TOPICALLY TO THE APPROPRIATE AREA AS DIRECTED EVERY NIGHT., Disp: 45 g, Rfl: 5    fluticasone (FLONASE) 50 MCG/ACT nasal spray, 2 sprays into the nostril(s) as directed by provider Daily., Disp: 48 mL, Rfl: 3    levocetirizine (XYZAL) 5 MG tablet, TAKE 1 TABLET BY MOUTH EVERY DAY IN THE EVENING, Disp: 90 tablet, Rfl: 0    losartan (COZAAR) 50 MG tablet, Take 1 tablet by mouth Daily., Disp: 90 tablet, Rfl: 3    mesalamine (LIALDA) 1.2 g EC tablet, Take 2 tablets by mouth Daily., Disp: , Rfl:     Needle, Disp, (Hypodermic Needle) 18G X 1\" misc, Use every week to drawn up testosterone, Disp: 20 each, Rfl: 3    neomycin-polymyxin-hydrocortisone (CORTISPORIN) 3.5-57302-1 otic solution, Administer 3 drops into the left ear 4 (Four) Times a Day., Disp: 10 mL, Rfl: 0    Syringe 21G X 1-1/4\" 3 ML misc, USE TO INJECT TESTOSTERONE EVERY WEEK, Disp: 20 each, Rfl: 3    Testosterone Cypionate (DEPOTESTOTERONE CYPIONATE) 200 MG/ML " "injection, INJECT 1ML WEEKLY INTO THE APPROPRIATE AREA AS DIRECTED, VIALS ARE SINGLE USE, Disp: 4 mL, Rfl: 2    oseltamivir (Tamiflu) 75 MG capsule, Take 1 capsule by mouth Daily for 10 days., Disp: 10 capsule, Rfl: 0    Allergies:   Allergies   Allergen Reactions    Sulfa Antibiotics        Objective     Physical Exam: Please see above  Vital Signs:   Vitals:    02/03/25 1127   BP: 130/74   Pulse: 88   Temp: 97.3 °F (36.3 °C)   TempSrc: Infrared   SpO2: 97%   Weight: 95.3 kg (210 lb)   Height: 177.8 cm (70\")   PainSc: 0-No pain     Body mass index is 30.13 kg/m².          Physical Exam  Vitals and nursing note reviewed.   Constitutional:       Appearance: Normal appearance. He is ill-appearing (looks as if he feels tired.).   HENT:      Head: Normocephalic and atraumatic.   Neck:      Vascular: No carotid bruit.   Cardiovascular:      Rate and Rhythm: Normal rate and regular rhythm.      Heart sounds: Normal heart sounds. No murmur heard.  Pulmonary:      Effort: Pulmonary effort is normal.      Breath sounds: Normal breath sounds.   Abdominal:      General: Bowel sounds are normal.      Palpations: Abdomen is soft. There is no mass.      Tenderness: There is no abdominal tenderness.   Musculoskeletal:      Right lower leg: No edema.      Left lower leg: No edema.   Skin:     Coloration: Skin is not jaundiced or pale.      Findings: No erythema.   Neurological:      Mental Status: He is alert. Mental status is at baseline.   Psychiatric:         Mood and Affect: Mood normal.         Behavior: Behavior normal.         Procedures    Results:   Labs:   Hemoglobin A1C   Date Value Ref Range Status   11/11/2022 5.70 (H) 4.80 - 5.60 % Final     TSH   Date Value Ref Range Status   06/07/2024 2.060 0.270 - 4.200 uIU/mL Final        POCT Results (if applicable):   Results for orders placed or performed in visit on 12/30/24   JAK2 V617F, reflex to CALR + MPL + E12-15    Collection Time: 12/30/24  3:23 PM    Specimen: Blood "   Result Value Ref Range    Specimen Type Blood     JAK2 V617F Result Comment     Reflex Comment     V617F Rfx CALR/MPL/E12-15 Bkgd Comment     Method Comment     References: Comment     Director Review Comment    Erythropoietin    Collection Time: 12/30/24  3:23 PM    Specimen: Blood   Result Value Ref Range    Erythropoietin 16.8 2.6 - 18.5 mIU/mL   CBC Auto Differential    Collection Time: 12/30/24  3:23 PM    Specimen: Blood   Result Value Ref Range    WBC 9.48 3.40 - 10.80 10*3/mm3    RBC 6.23 (H) 4.14 - 5.80 10*6/mm3    Hemoglobin 16.9 13.0 - 17.7 g/dL    Hematocrit 50.8 37.5 - 51.0 %    MCV 81.5 79.0 - 97.0 fL    MCH 27.1 26.6 - 33.0 pg    MCHC 33.3 31.5 - 35.7 g/dL    RDW 12.3 12.3 - 15.4 %    RDW-SD 37.0 37.0 - 54.0 fl    MPV 10.8 6.0 - 12.0 fL    Platelets 238 140 - 450 10*3/mm3    Neutrophil % 51.3 42.7 - 76.0 %    Lymphocyte % 32.9 19.6 - 45.3 %    Monocyte % 13.2 (H) 5.0 - 12.0 %    Eosinophil % 1.8 0.3 - 6.2 %    Basophil % 0.7 0.0 - 1.5 %    Immature Grans % 0.1 0.0 - 0.5 %    Neutrophils, Absolute 4.86 1.70 - 7.00 10*3/mm3    Lymphocytes, Absolute 3.12 (H) 0.70 - 3.10 10*3/mm3    Monocytes, Absolute 1.25 (H) 0.10 - 0.90 10*3/mm3    Eosinophils, Absolute 0.17 0.00 - 0.40 10*3/mm3    Basophils, Absolute 0.07 0.00 - 0.20 10*3/mm3    Immature Grans, Absolute 0.01 0.00 - 0.05 10*3/mm3   CALR + MPL + E12-E15 - Blood,    Collection Time: 12/30/24  3:23 PM    Specimen: Blood   Result Value Ref Range    CALR Mutation Analysis Result Comment     MPL Mutation Result Comment     E12-15 Result Comment        PHQ-9: PHQ-9 Total Score:       Imaging:   No valid procedures specified.     Measures:   Advanced Care Planning:   Patient does not have an advance directive, declines further assistance.    Smoking Cessation:   Does not smoke    Assessment / Plan      Assessment/Plan:   Testing in office today negative for influenza A/B    1. Exposure to the flu  Patient with exposure to flu.  Not clear if patient is  starting to have symptoms of flu, and just not testing positive yet, or just exposed.  Will prescribe Tamiflu for prophylaxis 75 mg capsule to be taken once daily for 10 days.  Discussed with patient if he starts having worsening symptoms, to start taking the Tamiflu twice daily, as he would likely have flu.  Encourage patient to drink plenty of fluids.  Tylenol if needed for fever.  Xyzal on hand daily.  Cough syrup or drops if needed.    - oseltamivir (Tamiflu) 75 MG capsule; Take 1 capsule by mouth Daily for 10 days.  Dispense: 10 capsule; Refill: 0      Vaccine Counseling:      Follow Up:   Return if symptoms worsen or fail to improve, for or as scheduled withpcp..      DO HAYLEE Gómez

## 2025-02-03 NOTE — PATIENT INSTRUCTIONS
Tamiflu as ordered.  Drink plenty of fluids.  Tylenol if needed for fever.  Xyzal on hand daily.  Cough syrup or drops if needed.

## 2025-02-05 NOTE — PROGRESS NOTES
Sleep Clinic Follow Up Note    Chief Complaint  Follow-up and Sleep Apnea    Subjective     History of Present Illness (from previous encounter on 2/5/2024):  Iam Blanco is a 53 y.o. male who returns for follow-up and compliance of PAP therapy.  The Pap report has been reviewed.  Overall usage is at 100% with compliance at 98%.  The patient averages 6 hours 42 minutes of therapy.  Sleep apnea is controlled with an AHI of 0.5/H.  He has a history of mild sleep apnea with an initial AHI of 8.4/H. I will refill the patient's supplies, and I have asked them to return for follow-up and compliance in 1 year or sooner should they have further questions or concerns. (End copied text).    Interval History:  Iam Blanco is a 54 y.o. male returns for follow up and compliance of PAP therapy. The patient was last seen on 2/5/2024 by me. Overall the patient feels good with regard to therapy. The device appears to be working appropriately. On average the patient sleeps 6-7 hours per night. The patient wakes 2-3 times per night. He was ill last week and changed the mask. He woke with mask leakage.     The patient reports the following changes to their medical and medication history since they were last seen:  Seen at Hematology for elevated HCT.    Further details are as follows:      [place] Scale is (out of 24): Total score: 8     Weight:  Current Weight: 207 lb    Weight change in the last year:  gain: 0 lbs    The patient's relevant past medical, surgical, family, and social history reviewed and updated in Epic as appropriate.    PMH:    Past Medical History:   Diagnosis Date    Anxiety     H/O esophageal reflux     Hypertension     Influenza     Sleep apnea     Testosterone deficiency 10/2021    Tobacco abuse     Toe pain      Past Surgical History:   Procedure Laterality Date    HERNIA REPAIR      WISDOM TOOTH EXTRACTION         Allergies   Allergen Reactions    Sulfa Antibiotics        MEDS:  Prior to Admission  "medications    Medication Sig Start Date End Date Taking? Authorizing Provider   adapalene (DIFFERIN) 0.1 % cream APPLY TOPICALLY TO THE APPROPRIATE AREA AS DIRECTED EVERY NIGHT. 8/13/24   Durga Hernandez DO   fluticasone (FLONASE) 50 MCG/ACT nasal spray 2 sprays into the nostril(s) as directed by provider Daily. 12/1/23   Durga Hernandez DO   levocetirizine (XYZAL) 5 MG tablet TAKE 1 TABLET BY MOUTH EVERY DAY IN THE EVENING 12/2/24   Lena Baig DO   losartan (COZAAR) 50 MG tablet Take 1 tablet by mouth Daily. 6/7/24   Durga Hernandez DO   mesalamine (LIALDA) 1.2 g EC tablet Take 2 tablets by mouth Daily. 3/10/23   Provider, MD Tonya   Needle, Disp, (Hypodermic Needle) 18G X 1\" misc Use every week to drawn up testosterone 9/19/22   Kenny Ryan MD   neomycin-polymyxin-hydrocortisone (CORTISPORIN) 3.5-33788-2 otic solution Administer 3 drops into the left ear 4 (Four) Times a Day. 12/6/24   Lena Baig DO   Syringe 21G X 1-1/4\" 3 ML misc USE TO INJECT TESTOSTERONE EVERY WEEK 9/19/22   Kenny Ryan MD   Testosterone Cypionate (DEPOTESTOTERONE CYPIONATE) 200 MG/ML injection INJECT 1ML WEEKLY INTO THE APPROPRIATE AREA AS DIRECTED, VIALS ARE SINGLE USE 12/30/24   Kenny Ryan MD         FH:  Family History   Problem Relation Age of Onset    Sleep apnea Mother     Diabetes Mother     Heart disease Father     Hypertension Father     Cancer Sister         Skin cancer    Diabetes Maternal Grandfather        Objective   Vital Signs:  /78   Pulse 83   Temp 98.5 °F (36.9 °C) (Oral)   Ht 177.8 cm (70\")   Wt 94.1 kg (207 lb 8 oz)   SpO2 97%   BMI 29.77 kg/m²     Patient's (Body mass index is 29.77 kg/m².) indicates that they are overweight (BMI 25-29.9) with health related conditions that include obstructive sleep apnea .            Physical Exam  Vitals reviewed.   Constitutional:       Appearance: Normal appearance.   HENT:      Head: Normocephalic and atraumatic.      " Nose: Nose normal.      Mouth/Throat:      Mouth: Mucous membranes are moist.   Cardiovascular:      Rate and Rhythm: Normal rate and regular rhythm.      Heart sounds: No murmur heard.     No friction rub. No gallop.   Pulmonary:      Effort: Pulmonary effort is normal. No respiratory distress.      Breath sounds: Normal breath sounds. No wheezing or rhonchi.   Neurological:      Mental Status: He is alert and oriented to person, place, and time.   Psychiatric:         Behavior: Behavior normal.               Result Review :           PAP Report:  AHI: 0.4/h  Days of Usage: 90/90 (100%)  Number of Days Greater than 4 hours: 89/90 (99%)  Average time (days used): 6 hours 58 minutes  95th Percentile Pressure: 13.9 cmH2O  95th percentile leaks: 8.7 L/min  Settings: Auto CPAP-8/18 cm H2O, EPR full-time, EPR level 1, response standard.       Assessment and Plan  Iam Blanco is a 54 y.o. male who returns for follow-up and compliance of PAP therapy.  The Pap report has been reviewed.  Overall usage is at 100% with compliance at 99%.  The patient averages 6 hours and 58 minutes of therapy.  Sleep apnea is well-controlled with an AHI of 0.4/h. I will refill the patient's supplies, and I have asked them to return for follow-up and compliance in 1 year or sooner should they have further questions or concerns.    Diagnoses and all orders for this visit:    1. MARS (obstructive sleep apnea) (Primary)  -     PAP Therapy    2. Overweight (BMI 25.0-29.9)         The patient continues to use and benefit from PAP therapy.    1. The patient was counseled regarding multimodal approach with healthy nutrition, healthy sleep, regular physical activity, social activities, counseling, and medications. Encouraged to practice lateral sleep position. Avoid alcohol and sedatives close to bedtime.     2.  We will refill supplies x1 year.  Return to clinic 1 year or sooner if symptoms warrant. I have reviewed the results of my evaluation  and impression and discussed my recommendations in detail with the patient.           Follow Up  Return in about 1 year (around 2/10/2026) for Annual visit.  Patient was given instructions and counseling regarding his condition or for health maintenance advice. Please see specific information pulled into the AVS if appropriate.       RYANN Curiel, ACNP-BC  Pulmonology, Critical Care, and Sleep Medicine

## 2025-02-10 ENCOUNTER — OFFICE VISIT (OUTPATIENT)
Dept: SLEEP MEDICINE | Age: 55
End: 2025-02-10
Payer: COMMERCIAL

## 2025-02-10 VITALS
SYSTOLIC BLOOD PRESSURE: 130 MMHG | BODY MASS INDEX: 29.71 KG/M2 | HEIGHT: 70 IN | WEIGHT: 207.5 LBS | DIASTOLIC BLOOD PRESSURE: 78 MMHG | TEMPERATURE: 98.5 F | OXYGEN SATURATION: 97 % | HEART RATE: 83 BPM

## 2025-02-10 DIAGNOSIS — G47.33 OSA (OBSTRUCTIVE SLEEP APNEA): Primary | ICD-10-CM

## 2025-02-10 DIAGNOSIS — E66.3 OVERWEIGHT (BMI 25.0-29.9): ICD-10-CM

## 2025-02-19 DIAGNOSIS — J30.2 SEASONAL ALLERGIES: ICD-10-CM

## 2025-02-19 RX ORDER — LEVOCETIRIZINE DIHYDROCHLORIDE 5 MG/1
TABLET, FILM COATED ORAL
Qty: 90 TABLET | Refills: 0 | Status: SHIPPED | OUTPATIENT
Start: 2025-02-19

## 2025-02-19 RX ORDER — FLUTICASONE PROPIONATE 50 MCG
2 SPRAY, SUSPENSION (ML) NASAL DAILY
Qty: 16 G | Refills: 11 | Status: SHIPPED | OUTPATIENT
Start: 2025-02-19

## 2025-04-02 ENCOUNTER — OFFICE VISIT (OUTPATIENT)
Age: 55
End: 2025-04-02
Payer: COMMERCIAL

## 2025-04-02 VITALS
SYSTOLIC BLOOD PRESSURE: 131 MMHG | HEIGHT: 70 IN | HEART RATE: 71 BPM | DIASTOLIC BLOOD PRESSURE: 78 MMHG | OXYGEN SATURATION: 97 % | BODY MASS INDEX: 29.35 KG/M2 | WEIGHT: 205 LBS

## 2025-04-02 DIAGNOSIS — R79.89 LOW TESTOSTERONE IN MALE: Primary | ICD-10-CM

## 2025-04-02 DIAGNOSIS — Z12.5 PROSTATE CANCER SCREENING: ICD-10-CM

## 2025-04-02 DIAGNOSIS — R79.89 LOW TESTOSTERONE IN MALE: ICD-10-CM

## 2025-04-02 DIAGNOSIS — N62 GYNECOMASTIA: ICD-10-CM

## 2025-04-02 PROCEDURE — 82670 ASSAY OF TOTAL ESTRADIOL: CPT | Performed by: INTERNAL MEDICINE

## 2025-04-02 PROCEDURE — 84403 ASSAY OF TOTAL TESTOSTERONE: CPT | Performed by: INTERNAL MEDICINE

## 2025-04-02 PROCEDURE — G0103 PSA SCREENING: HCPCS | Performed by: INTERNAL MEDICINE

## 2025-04-02 RX ORDER — TESTOSTERONE CYPIONATE 200 MG/ML
INJECTION, SOLUTION INTRAMUSCULAR
Qty: 4 ML | Refills: 2 | OUTPATIENT
Start: 2025-04-02

## 2025-04-02 RX ORDER — TESTOSTERONE CYPIONATE 200 MG/ML
INJECTION, SOLUTION INTRAMUSCULAR
Qty: 4 ML | Refills: 5 | Status: SHIPPED | OUTPATIENT
Start: 2025-04-02

## 2025-04-02 NOTE — PROGRESS NOTES
"     Office Note      Date: 2025  Patient Name: Iam Blanco  MRN: [id number]  : 1970    Chief Complaint   Patient presents with    Low testosterone in male       History of Present Illness:   Iam Blanco is a 54 y.o. male who presents for Low testosterone in male  .   Current rx: TRT- 100 mg per week     Changes in history:is getting therapeutic phlebotomy for increase hct   Questions /problems: notes gynecomastia     Subjective          Review of Systems:   Review of Systems   Genitourinary:  Negative for difficulty urinating.       The following portions of the patient's history were reviewed and updated as appropriate: allergies, current medications, past family history, past medical history, past social history, past surgical history, and problem list.    Objective     Visit Vitals  /78 (BP Location: Right arm, Patient Position: Sitting)   Pulse 71   Ht 177.8 cm (70\")   Wt 93 kg (205 lb)   SpO2 97%   BMI 29.41 kg/m²           Physical Exam:  Physical Exam  Vitals reviewed.   Constitutional:       Appearance: Normal appearance.   Chest:   Breasts:     Right: Tenderness present. No mass or nipple discharge.      Left: Tenderness present. No mass or nipple discharge.   Neurological:      Mental Status: He is alert.         Assessment / Plan      Assessment & Plan:  Problem List Items Addressed This Visit       Low testosterone in male - Primary    Overview   Has had 2 low testosterone levels  Started replacement in 2022         Current Assessment & Plan   Stable.  Gynecomastia not uncommon in this situation. Due to conversion of T to E in fat cells. We can treat with either tamoxifen or anastrozole depending upon the testosterone level          Relevant Medications    Testosterone Cypionate (DEPOTESTOTERONE CYPIONATE) 200 MG/ML injection    Other Relevant Orders    Testosterone    Estradiol    Prostate cancer screening    Relevant Orders    PSA Screen    Gynecomastia    "     Electronically signed by : Kenny Ryan MD   04/02/2025

## 2025-04-02 NOTE — ASSESSMENT & PLAN NOTE
Stable.  Gynecomastia not uncommon in this situation. Due to conversion of T to E in fat cells. We can treat with either tamoxifen or anastrozole depending upon the testosterone level

## 2025-04-03 LAB
ESTRADIOL SERPL HS-MCNC: 26 PG/ML
PSA SERPL-MCNC: 1 NG/ML (ref 0–4)
TESTOST SERPL-MCNC: 545 NG/DL (ref 193–740)

## 2025-05-19 RX ORDER — LEVOCETIRIZINE DIHYDROCHLORIDE 5 MG/1
5 TABLET, FILM COATED ORAL EVERY EVENING
Qty: 90 TABLET | Refills: 0 | Status: SHIPPED | OUTPATIENT
Start: 2025-05-19

## 2025-05-29 DIAGNOSIS — I10 ESSENTIAL HYPERTENSION: ICD-10-CM

## 2025-05-29 RX ORDER — LOSARTAN POTASSIUM 50 MG/1
50 TABLET ORAL DAILY
Qty: 90 TABLET | Refills: 3 | Status: SHIPPED | OUTPATIENT
Start: 2025-05-29

## 2025-05-30 RX ORDER — TAMOXIFEN CITRATE 20 MG/1
20 TABLET ORAL DAILY
Qty: 30 TABLET | Refills: 2 | Status: SHIPPED | OUTPATIENT
Start: 2025-05-30

## 2025-05-30 NOTE — TELEPHONE ENCOUNTER
Caller: Iam Blanco    Relationship: Self    Best call back number: [phone]     Requested Prescriptions:   Requested Prescriptions     Pending Prescriptions Disp Refills    tamoxifen (NOLVADEX) 20 MG chemo tablet 30 tablet 5     Sig: Take 1 tablet by mouth Daily.        Pharmacy where request should be sent:    [facility]/pharmacy #[id number] - [place], [place] - [address] [facility] - [phone]  - [phone]    [address] [facility] [address]   Phone: [phone] Fax: [phone]   Hours: Not open 24 hours     Last office visit with prescribing clinician: 4/2/2025   Last telemedicine visit with prescribing clinician: Visit date not found   Next office visit with prescribing clinician: 10/3/2025     Additional details provided by patient: PT IS NEEDING 90 DAY SUPPLY FOR INSURANCE TO COVER THIS MEDICATION.     Does the patient have less than a 3 day supply:  [x] Yes  [] No    Would you like a call back once the refill request has been completed: [x] Yes [] No    If the office needs to give you a call back, can they leave a voicemail: [x] Yes [] No    Nasir Narayanan   05/30/25 11:26 EDT

## 2025-05-30 NOTE — TELEPHONE ENCOUNTER
Rx Refill Note  Requested Prescriptions     Pending Prescriptions Disp Refills    tamoxifen (NOLVADEX) 20 MG chemo tablet 30 tablet 2     Sig: Take 1 tablet by mouth Daily.      Last office visit with prescribing clinician: 4/2/2025      Next office visit with prescribing clinician: 10/3/2025       Justa Vallejo MA  05/30/25, 11:42 EDT

## 2025-06-11 RX ORDER — TAMOXIFEN CITRATE 20 MG/1
20 TABLET ORAL DAILY
Qty: 30 TABLET | Refills: 2 | OUTPATIENT
Start: 2025-06-11

## 2025-06-13 RX ORDER — TAMOXIFEN CITRATE 20 MG/1
20 TABLET ORAL DAILY
Qty: 30 TABLET | Refills: 2 | OUTPATIENT
Start: 2025-06-13

## 2025-08-14 RX ORDER — LEVOCETIRIZINE DIHYDROCHLORIDE 5 MG/1
5 TABLET, FILM COATED ORAL EVERY EVENING
Qty: 90 TABLET | Refills: 0 | Status: SHIPPED | OUTPATIENT
Start: 2025-08-14